# Patient Record
Sex: FEMALE | Race: WHITE | NOT HISPANIC OR LATINO | Employment: OTHER | URBAN - METROPOLITAN AREA
[De-identification: names, ages, dates, MRNs, and addresses within clinical notes are randomized per-mention and may not be internally consistent; named-entity substitution may affect disease eponyms.]

---

## 2024-10-15 ENCOUNTER — HOSPITAL ENCOUNTER (OUTPATIENT)
Dept: RADIOLOGY | Facility: HOSPITAL | Age: 49
Discharge: HOME | End: 2024-10-15
Payer: COMMERCIAL

## 2024-10-15 VITALS — HEIGHT: 64 IN | WEIGHT: 285 LBS | BODY MASS INDEX: 48.65 KG/M2

## 2024-10-15 DIAGNOSIS — Z12.31 ENCOUNTER FOR SCREENING MAMMOGRAM FOR MALIGNANT NEOPLASM OF BREAST: ICD-10-CM

## 2024-10-15 PROCEDURE — 77067 SCR MAMMO BI INCL CAD: CPT

## 2024-10-15 PROCEDURE — 77067 SCR MAMMO BI INCL CAD: CPT | Performed by: RADIOLOGY

## 2024-10-15 PROCEDURE — 77063 BREAST TOMOSYNTHESIS BI: CPT | Performed by: RADIOLOGY

## 2024-11-01 ENCOUNTER — TELEPHONE (OUTPATIENT)
Dept: PRIMARY CARE | Facility: CLINIC | Age: 49
End: 2024-11-01
Payer: COMMERCIAL

## 2024-11-01 DIAGNOSIS — R92.8 ABNORMAL MAMMOGRAM OF BOTH BREASTS: Primary | ICD-10-CM

## 2024-11-01 NOTE — TELEPHONE ENCOUNTER
PT had her mammogram done 10/10/2024 and pt states she needs an order for a diagnostic mammogram. Pt would like to have this test done before her appt. With Kasey on 11/29/2024. Please advise pt [phone]

## 2024-11-06 PROBLEM — R92.8 ABNORMAL MAMMOGRAM OF BOTH BREASTS: Status: ACTIVE | Noted: 2024-11-06

## 2024-11-20 PROBLEM — U07.1 VIREMIA DUE TO SEVERE ACUTE RESPIRATORY SYNDROME CORONAVIRUS 2 (SARS-COV-2): Status: RESOLVED | Noted: 2024-11-20 | Resolved: 2024-11-20

## 2024-11-20 PROBLEM — J31.0 CHRONIC RHINITIS: Status: ACTIVE | Noted: 2024-11-20

## 2024-11-20 PROBLEM — R09.81 NASAL CONGESTION: Status: RESOLVED | Noted: 2019-02-06 | Resolved: 2024-11-20

## 2024-11-20 PROBLEM — R05.9 COUGH: Status: RESOLVED | Noted: 2019-02-06 | Resolved: 2024-11-20

## 2024-11-21 ENCOUNTER — APPOINTMENT (OUTPATIENT)
Dept: PRIMARY CARE | Facility: CLINIC | Age: 49
End: 2024-11-21
Payer: COMMERCIAL

## 2024-11-21 ENCOUNTER — TELEPHONE (OUTPATIENT)
Dept: PRIMARY CARE | Facility: CLINIC | Age: 49
End: 2024-11-21

## 2024-11-21 VITALS
OXYGEN SATURATION: 97 % | BODY MASS INDEX: 47.73 KG/M2 | HEIGHT: 62 IN | SYSTOLIC BLOOD PRESSURE: 98 MMHG | DIASTOLIC BLOOD PRESSURE: 64 MMHG | WEIGHT: 259.38 LBS | HEART RATE: 101 BPM

## 2024-11-21 DIAGNOSIS — Z00.00 HEALTHCARE MAINTENANCE: ICD-10-CM

## 2024-11-21 DIAGNOSIS — Z12.11 SCREENING FOR COLON CANCER: ICD-10-CM

## 2024-11-21 DIAGNOSIS — B37.2 CANDIDIASIS, INTERTRIGINOUS: Primary | ICD-10-CM

## 2024-11-21 PROCEDURE — 1036F TOBACCO NON-USER: CPT | Performed by: FAMILY MEDICINE

## 2024-11-21 PROCEDURE — 3008F BODY MASS INDEX DOCD: CPT | Performed by: FAMILY MEDICINE

## 2024-11-21 PROCEDURE — 99214 OFFICE O/P EST MOD 30 MIN: CPT | Performed by: FAMILY MEDICINE

## 2024-11-21 RX ORDER — NYSTATIN 100000 [USP'U]/G
1 POWDER TOPICAL 3 TIMES DAILY PRN
Qty: 60 G | Refills: 1 | Status: SHIPPED | OUTPATIENT
Start: 2024-11-21

## 2024-11-21 ASSESSMENT — PATIENT HEALTH QUESTIONNAIRE - PHQ9
1. LITTLE INTEREST OR PLEASURE IN DOING THINGS: NOT AT ALL
2. FEELING DOWN, DEPRESSED OR HOPELESS: NOT AT ALL
SUM OF ALL RESPONSES TO PHQ9 QUESTIONS 1 AND 2: 0

## 2024-11-21 ASSESSMENT — ENCOUNTER SYMPTOMS
LOSS OF SENSATION IN FEET: 0
DEPRESSION: 0
OCCASIONAL FEELINGS OF UNSTEADINESS: 0

## 2024-11-21 NOTE — PROGRESS NOTES
"Subjective   Patient ID: Helga Sampson is a 49 y.o. female who presents for Establish Care (Pt presents as a new pt to establish care, c/o chaffing and itching in groin area with little bumps that come and go, no refills.).  HPI Historian(s): Self    Not spreading. Tried [employer] menthol powder with only symptomatic relief. Started about 2.5w ago.  Denies fever, chills, sweats. Otherwise feeling well.    Review of Systems   Skin:  Positive for rash.   All other systems reviewed and are negative.    Patient's last menstrual period was 10/28/2024 (approximate).    Patient Care Team:  Roderick Norman DO as PCP - General (Family Medicine)    Current Outpatient Medications   Medication Instructions    nystatin (Mycostatin) 100,000 unit/gram powder 1 Application, Topical, 3 times daily PRN       Objective   BP 98/64   Pulse 101   Ht 1.575 m (5' 2\")   Wt 118 kg (259 lb 6 oz)   LMP 10/28/2024 (Approximate)   SpO2 97%   BMI 47.44 kg/m²           Physical Exam  Vitals and nursing note reviewed. Exam conducted with a chaperone present (Elsy).   Constitutional:       General: She is not in acute distress.     Appearance: Normal appearance.      Comments: No assistive device presently being used.   HENT:      Head: Normocephalic and atraumatic.   Eyes:      General: No scleral icterus.     Extraocular Movements: Extraocular movements intact.      Conjunctiva/sclera: Conjunctivae normal.   Pulmonary:      Effort: Pulmonary effort is normal. No respiratory distress.   Skin:     General: Skin is warm and dry.      Coloration: Skin is not jaundiced.      Findings: Rash (beefy red in bilateral inguinal folds with satellite lesions) present.   Neurological:      Mental Status: She is alert and oriented to person, place, and time. Mental status is at baseline.   Psychiatric:         Behavior: Behavior normal.         Assessment & Plan  Candidiasis, intertriginous  Nystatin. No apparent secondary infection, " presently.  Orders:    nystatin (Mycostatin) 100,000 unit/gram powder; Apply 1 Application topically 3 times a day as needed for other (skin yeast infection).    Healthcare maintenance    Orders:    CBC; Future    Comprehensive Metabolic Panel; Future    Lipid Panel; Future    Hemoglobin A1C; Future    Screening for colon cancer    Orders:    Colonoscopy Screening; Average Risk Patient; Future

## 2024-11-21 NOTE — TELEPHONE ENCOUNTER
Patient called asking if Dr. Norman could recommend a good soap to help her with whatever was discussed at her visit today

## 2024-11-21 NOTE — PATIENT INSTRUCTIONS
Obstetrics and/or Gynecology   [facility], Dr. Frances Alberto, Dr. Bella Mcclain, Dr. Coco Kruger, Dr. Tra Mohan [phone]  Dr. Richard Burnette [phone]  Dr. Anshul Sanchez [phone]  [facility] [place] [phone]  [place]  Dr. Tamika Mcmanus, Dr. Corinne Bazella, Maxine Bridges CNP, et al. [phone]    Please return for your next Wellness visit within the next 1-2 months, or 12 months after your last Wellness visit. Please schedule additional problem-focused appointment(s) to address additional problem(s).    Recommended vaccines:  Influenza, annual  Shingrix (shingles) vaccine series  TDaP (tetanus-diphtheria-pertussis) vaccine  Avoid taking Biotin (a vitamin, shows up particularly in hair/nail supplements) for a week prior to any blood tests, as it can interfere with certain results. Fasting for labs means 12 hours, nothing to eat or drink, except water and medications, unless directed otherwise.    For assistance with scheduling referrals or consultations, please call [phone]. For laboratory, radiology, and other tests, please call [phone] ([phone] for pediatrics). Please review prescription inserts and published information for possible adverse effects of all medications. Return after testing or consultation to review results and recommendations, if symptoms persist, change, worsen, or return, or if you have any question or concern. If you do not get results within 7-10 days, or you have any question or concern, please send a message, call the office ([phone]), or return to the office for a follow-up appointment. For non-emergencies, you may call the office. For emergencies, call 9-1-1 or go to the nearest Emergency Department. Please schedule additional appointment(s) to address concern(s) not addressed today. An annual Wellness visit is strongly recommended. A Wellness visit should be dedicated to addressing routine health maintenance matters  (e.g., cancer screenings, cardiovascular screening, etc.). Problem-focused visits, typically prompted by symptoms or specific concerns, are usually conducted separately, particularly if multiple or complex problems need to be addressed.    In general, results are not released or discussed over the telephone, but at an appointment or via  [facility]. Results of tests done through [facility] are released via  [facility] (see below).  [email or web address]   [facility] support line: [phone]

## 2024-11-22 ENCOUNTER — HOSPITAL ENCOUNTER (OUTPATIENT)
Dept: RADIOLOGY | Facility: HOSPITAL | Age: 49
Discharge: HOME | End: 2024-11-22
Payer: COMMERCIAL

## 2024-11-22 DIAGNOSIS — R92.8 ABNORMAL MAMMOGRAM OF BOTH BREASTS: ICD-10-CM

## 2024-11-22 PROCEDURE — 77062 BREAST TOMOSYNTHESIS BI: CPT

## 2024-11-22 PROCEDURE — 76642 ULTRASOUND BREAST LIMITED: CPT | Mod: 50

## 2024-11-22 PROCEDURE — 76983 USE EA ADDL TARGET LESION: CPT | Mod: 50

## 2024-11-25 ENCOUNTER — PHARMACY VISIT (OUTPATIENT)
Dept: PHARMACY | Facility: CLINIC | Age: 49
End: 2024-11-25
Payer: COMMERCIAL

## 2024-11-25 ENCOUNTER — TELEPHONE (OUTPATIENT)
Dept: PRIMARY CARE | Facility: CLINIC | Age: 49
End: 2024-11-25
Payer: COMMERCIAL

## 2024-11-25 DIAGNOSIS — B37.2 CANDIDIASIS, INTERTRIGINOUS: Primary | ICD-10-CM

## 2024-11-25 DIAGNOSIS — R92.8 ABNORMAL MAMMOGRAM OF BOTH BREASTS: Primary | ICD-10-CM

## 2024-11-25 PROCEDURE — RXMED WILLOW AMBULATORY MEDICATION CHARGE

## 2024-11-25 RX ORDER — DOXYCYCLINE 100 MG/1
100 CAPSULE ORAL 2 TIMES DAILY
Qty: 20 CAPSULE | Refills: 0 | Status: SHIPPED | OUTPATIENT
Start: 2024-11-25 | End: 2024-12-05

## 2024-11-25 NOTE — TELEPHONE ENCOUNTER
Result Communication    Resulted Orders   BI mammo bilateral diagnostic tomosynthesis    Narrative    Interpreted By:  Jacquelyn Swan,   STUDY:  BI MAMMO BILATERAL DIAGNOSTIC TOMOSYNTHESIS; BI US BREAST LIMITED  BILATERAL;  11/22/2024 1:48 pm; 11/22/2024 3:32 pm      ACCESSION NUMBER(S):  [id number]; [id number]      ORDERING CLINICIAN:  YING HOUSTON      INDICATION:  The patient was recalled from recent screening mammogram dated  10/15/2024 for bilateral breast asymmetries and bilateral breast  masses. Family history of breast cancer in paternal grandmother.      ,R92.8 Other abnormal and inconclusive findings on diagnostic imaging  of breast      COMPARISON:  Screening mammogram dated 10/15/2024.      FINDINGS:  MAMMOGRAPHY: 2D and tomosynthesis images were reviewed at 1 mm slice  thickness.      Density:  The breasts are extremely dense, which lowers the  sensitivity of mammography.      Additional spot compression tomosynthesis views demonstrates  resolution of asymmetries into normal fibroglandular and fatty breast  tissue with faint persistence. However bilateral breast masses are  again noted on spot compression tomosynthesis views and are further  evaluated with bilateral breast ultrasound.      ULTRASOUND: Targeted ultrasound was performed of the bilateral  breasts by a registered sonographer with elastography. Right breast:  In the right breast at 9 o'clock position, 10 cm from the nipple  there is an oval partially circumscribed and partially obscured  parallel hypoechoic mass measuring up to 1.1 x 0.8 x 0.8 cm.  Evaluation of this is slightly limited due to significant shadowing  from dense fibroglandular breast tissue overlying this region. Within  this limitation, there is no evidence of internal vascularity. This  is predominantly soft on elastography and is likely favored to  represent a probably benign probable complicated cyst/fibroadenoma.      In the right breast at 10 o'clock position,  10 cm from the nipple  there is an oval circumscribed parallel hypoechoic mass with  lobulated margins measuring up to 0.4 x 0.3 x 0.4 cm. No sonographic  evidence of suspicious features like internal vascularity or  posterior acoustic shadowing. This is soft on elastography. This is  likely favored to represent a probably benign probable in the right  breast at 10 o'clock position, 9 cm from the nipple there is another  oval circumscribed parallel hypoechoic mass with lobulated margins  measuring up to 2 x 1.2 x 1.5 cm. This is just adjacent to the  aforementioned mass at 10 o'clock position, 10 cm from the nipple.  This is predominantly soft on elastography. No evidence of suspicious  features like internal vascularity or posterior acoustic shadowing.  This likely corresponds to the larger mass in the right breast and is  likely favored to represent a probably benign probable fibroadenoma.  No additional suspicious masses are noted in the region of targeted  ultrasound.      Left breast: In the left breast at 2 o'clock position, 10 cm from the  nipple there is a partially circumscribed and partially obscured  hypoechoic mass measuring up to 0.5 x 0.5 x 0.6 cm. This is present  within a dense band of heterogenous tissue. This measures up to 0.5 x  0.5 x 0.6 cm. No sonographic evidence of suspicious features like  internal vascularity or posterior acoustic shadowing. This is soft on  elastography. This is likely favored to represent a probably benign  probable fibroadenoma.      In the left breast at 1 o'clock position, 10 cm from the nipple there  is an oval circumscribed parallel hypoechoic mass with microlobulated  margins measuring up to 1.8 x 1.2 x 1.5 cm. No sonographic evidence  of internal vascularity. However there is suggestion of mild  posterior acoustic shadowing. This is predominantly soft on  elastography. This corresponds to the largest mass in the left breast  and is likely favored to represent a  probably benign probable  fibroadenoma.      Extensive scanning of the left axilla demonstrates a morphologically  normal lymph node. No evidence of suspicious lymphadenopathy in the  left axilla.        Impression    Probably benign probable masses in bilateral breasts (3 masses in the  right breast and 2 masses in the left breast) as described above.  These are likely favored to represent probably benign fibroadenomas,  especially given presents in bilateral breasts. These masses most  likely correspond to the mammographic abnormality, however there is  also dense fibroglandular tissue in bilateral breasts with  questionable persistence of architectural distortion. Recommend short  interval follow-up bilateral mammogram and ultrasound in six-months  to document stability.      BI-RADS CATEGORY:  BI-RADS Category:  3 Probably Benign.  Recommendation:  Short-term Interval Follow-up Imaging.  Recommended Date:  6 Months.  Laterality:  Bilateral.      For any future breast imaging appointments, please call [phone]).      Patient letter sent SSHORT      MACRO:  None      Signed by: Jacquelyn Swan 11/22/2024 3:47 PM  Dictation workstation:   [id number]       5:45 PM      Results were successfully communicated with the patient and they acknowledged their understanding.

## 2024-11-25 NOTE — TELEPHONE ENCOUNTER
Patient is asking if its ok if she still uses the nystatin powder while taking doxycycline. Please advise and give her a call.

## 2024-11-25 NOTE — TELEPHONE ENCOUNTER
"----- Message from Roderick Maraserenity sent at 11/25/2024  9:01 AM EST -----  Please let patient know that her    Mammogram is reported as BI-RADS category 3, \"probably benign,\" but requires additional follow-up. Recommend follow-up imaging in 6 months, as ordered, with a follow-up appointment a couple days after, to review results. Return earlier, if any question or concern.  "

## 2024-11-25 NOTE — TELEPHONE ENCOUNTER
Result Communication    Resulted Orders   BI mammo bilateral diagnostic tomosynthesis    Narrative    Interpreted By:  Jacquelyn Swan,   STUDY:  BI MAMMO BILATERAL DIAGNOSTIC TOMOSYNTHESIS; BI US BREAST LIMITED  BILATERAL;  11/22/2024 1:48 pm; 11/22/2024 3:32 pm      ACCESSION NUMBER(S):  [id number]; [id number]      ORDERING CLINICIAN:  YING HOUSTON      INDICATION:  The patient was recalled from recent screening mammogram dated  10/15/2024 for bilateral breast asymmetries and bilateral breast  masses. Family history of breast cancer in paternal grandmother.      ,R92.8 Other abnormal and inconclusive findings on diagnostic imaging  of breast      COMPARISON:  Screening mammogram dated 10/15/2024.      FINDINGS:  MAMMOGRAPHY: 2D and tomosynthesis images were reviewed at 1 mm slice  thickness.      Density:  The breasts are extremely dense, which lowers the  sensitivity of mammography.      Additional spot compression tomosynthesis views demonstrates  resolution of asymmetries into normal fibroglandular and fatty breast  tissue with faint persistence. However bilateral breast masses are  again noted on spot compression tomosynthesis views and are further  evaluated with bilateral breast ultrasound.      ULTRASOUND: Targeted ultrasound was performed of the bilateral  breasts by a registered sonographer with elastography. Right breast:  In the right breast at 9 o'clock position, 10 cm from the nipple  there is an oval partially circumscribed and partially obscured  parallel hypoechoic mass measuring up to 1.1 x 0.8 x 0.8 cm.  Evaluation of this is slightly limited due to significant shadowing  from dense fibroglandular breast tissue overlying this region. Within  this limitation, there is no evidence of internal vascularity. This  is predominantly soft on elastography and is likely favored to  represent a probably benign probable complicated cyst/fibroadenoma.      In the right breast at 10 o'clock position,  10 cm from the nipple  there is an oval circumscribed parallel hypoechoic mass with  lobulated margins measuring up to 0.4 x 0.3 x 0.4 cm. No sonographic  evidence of suspicious features like internal vascularity or  posterior acoustic shadowing. This is soft on elastography. This is  likely favored to represent a probably benign probable in the right  breast at 10 o'clock position, 9 cm from the nipple there is another  oval circumscribed parallel hypoechoic mass with lobulated margins  measuring up to 2 x 1.2 x 1.5 cm. This is just adjacent to the  aforementioned mass at 10 o'clock position, 10 cm from the nipple.  This is predominantly soft on elastography. No evidence of suspicious  features like internal vascularity or posterior acoustic shadowing.  This likely corresponds to the larger mass in the right breast and is  likely favored to represent a probably benign probable fibroadenoma.  No additional suspicious masses are noted in the region of targeted  ultrasound.      Left breast: In the left breast at 2 o'clock position, 10 cm from the  nipple there is a partially circumscribed and partially obscured  hypoechoic mass measuring up to 0.5 x 0.5 x 0.6 cm. This is present  within a dense band of heterogenous tissue. This measures up to 0.5 x  0.5 x 0.6 cm. No sonographic evidence of suspicious features like  internal vascularity or posterior acoustic shadowing. This is soft on  elastography. This is likely favored to represent a probably benign  probable fibroadenoma.      In the left breast at 1 o'clock position, 10 cm from the nipple there  is an oval circumscribed parallel hypoechoic mass with microlobulated  margins measuring up to 1.8 x 1.2 x 1.5 cm. No sonographic evidence  of internal vascularity. However there is suggestion of mild  posterior acoustic shadowing. This is predominantly soft on  elastography. This corresponds to the largest mass in the left breast  and is likely favored to represent a  probably benign probable  fibroadenoma.      Extensive scanning of the left axilla demonstrates a morphologically  normal lymph node. No evidence of suspicious lymphadenopathy in the  left axilla.        Impression    Probably benign probable masses in bilateral breasts (3 masses in the  right breast and 2 masses in the left breast) as described above.  These are likely favored to represent probably benign fibroadenomas,  especially given presents in bilateral breasts. These masses most  likely correspond to the mammographic abnormality, however there is  also dense fibroglandular tissue in bilateral breasts with  questionable persistence of architectural distortion. Recommend short  interval follow-up bilateral mammogram and ultrasound in six-months  to document stability.      BI-RADS CATEGORY:  BI-RADS Category:  3 Probably Benign.  Recommendation:  Short-term Interval Follow-up Imaging.  Recommended Date:  6 Months.  Laterality:  Bilateral.      For any future breast imaging appointments, please call [phone]).      Patient letter sent SSHORT      MACRO:  None      Signed by: Jacquelyn Swan 11/22/2024 3:47 PM  Dictation workstation:   [id number]       5:08 PM      Results were successfully communicated with the patient and they acknowledged their understanding.

## 2024-11-25 NOTE — TELEPHONE ENCOUNTER
Pt states the cream is not working and if Kasey can send antibiotics to [facility]. Please advise pt [phone]     Pt is also asking for the ultrasound (bilateral breast) order to be added to her chart to make the appt. In 6 mths.

## 2024-11-29 ENCOUNTER — APPOINTMENT (OUTPATIENT)
Dept: PRIMARY CARE | Facility: CLINIC | Age: 49
End: 2024-11-29
Payer: COMMERCIAL

## 2024-12-04 ENCOUNTER — APPOINTMENT (OUTPATIENT)
Dept: PRIMARY CARE | Facility: CLINIC | Age: 49
End: 2024-12-04
Payer: COMMERCIAL

## 2024-12-06 ENCOUNTER — OFFICE VISIT (OUTPATIENT)
Dept: PRIMARY CARE | Facility: CLINIC | Age: 49
End: 2024-12-06
Payer: COMMERCIAL

## 2024-12-06 VITALS
BODY MASS INDEX: 47.46 KG/M2 | OXYGEN SATURATION: 97 % | DIASTOLIC BLOOD PRESSURE: 88 MMHG | WEIGHT: 259.5 LBS | HEART RATE: 98 BPM | SYSTOLIC BLOOD PRESSURE: 125 MMHG

## 2024-12-06 DIAGNOSIS — B37.2 CANDIDIASIS, INTERTRIGINOUS: Primary | ICD-10-CM

## 2024-12-06 DIAGNOSIS — L08.89 SECONDARY INFECTION OF SKIN: ICD-10-CM

## 2024-12-06 PROCEDURE — 99214 OFFICE O/P EST MOD 30 MIN: CPT | Performed by: FAMILY MEDICINE

## 2024-12-06 PROCEDURE — 1036F TOBACCO NON-USER: CPT | Performed by: FAMILY MEDICINE

## 2024-12-06 RX ORDER — DOXYCYCLINE 100 MG/1
100 CAPSULE ORAL 2 TIMES DAILY
Qty: 20 CAPSULE | Refills: 0 | Status: SHIPPED | OUTPATIENT
Start: 2024-12-06 | End: 2024-12-16

## 2024-12-06 RX ORDER — NYSTATIN 100000 [USP'U]/G
1 POWDER TOPICAL 3 TIMES DAILY PRN
Qty: 60 G | Refills: 2 | Status: SHIPPED | OUTPATIENT
Start: 2024-12-06

## 2024-12-06 RX ORDER — FLUCONAZOLE 150 MG/1
150 TABLET ORAL
Qty: 5 TABLET | Refills: 0 | Status: SHIPPED | OUTPATIENT
Start: 2024-12-08 | End: 2025-01-06

## 2024-12-06 ASSESSMENT — ENCOUNTER SYMPTOMS
DIAPHORESIS: 0
CHILLS: 0
FEVER: 0

## 2024-12-06 NOTE — PROGRESS NOTES
Subjective   Patient ID: Helga Sampson is a 49 y.o. female who presents for cysts (Pt presents c/o ongoing cysts that she is getting in groin region, states it gets better then comes back. Refill needed.).  HPI Historian(s): Self    Rash in inguinal creases has resolved. Rash in genital/mons pubis area almost disappears (but not quite), then flares up and comes back. Both the Nystatin and Doxycycline appeared to help.    Review of Systems   Constitutional:  Negative for chills, diaphoresis and fever.   Skin:  Positive for rash.   All other systems reviewed and are negative.    Patient's last menstrual period was 10/28/2024 (approximate).    Patient Care Team:  Roderick Norman DO as PCP - General (Family Medicine)    Current Outpatient Medications   Medication Instructions    doxycycline (VIBRAMYCIN) 100 mg, oral, 2 times daily, Take with at least 8 ounces (large glass) of water, do not lie down for 30 minutes after    [START ON 12/8/2024] fluconazole (DIFLUCAN) 150 mg, oral, Once Weekly    nystatin (Mycostatin) 100,000 unit/gram powder 1 Application, Topical, 3 times daily PRN       Objective   /88   Pulse 98   Wt 118 kg (259 lb 8 oz)   LMP 10/28/2024 (Approximate)   SpO2 97%   BMI 47.46 kg/m²           Physical Exam  Vitals and nursing note reviewed. Exam conducted with a chaperone present (Nikky).   Constitutional:       General: She is not in acute distress.     Appearance: Normal appearance.      Comments: No assistive device presently being used.   HENT:      Head: Normocephalic and atraumatic.   Eyes:      General: No scleral icterus.     Extraocular Movements: Extraocular movements intact.      Conjunctiva/sclera: Conjunctivae normal.   Pulmonary:      Effort: Pulmonary effort is normal. No respiratory distress.   Skin:     General: Skin is warm and dry.      Coloration: Skin is not jaundiced.      Findings: Rash (intertrigo inguinal folds and mons pubis, labia maiora) present.   Neurological:       Mental Status: She is alert and oriented to person, place, and time. Mental status is at baseline.   Psychiatric:         Behavior: Behavior normal.         Assessment & Plan  Candidiasis, intertriginous  Nystatin plus Fluconazole.  Orders:    nystatin (Mycostatin) 100,000 unit/gram powder; Apply 1 Application topically 3 times a day as needed for other (skin yeast infection).    fluconazole (Diflucan) 150 mg tablet; Take 1 tablet (150 mg) by mouth 1 (one) time per week for 5 doses.    Secondary infection of skin  Repeat doxycycline if does not clear up with Nystatin plus Fluconazole, or any worsening. Dermatology if still persists.  Orders:    doxycycline (Vibramycin) 100 mg capsule; Take 1 capsule (100 mg) by mouth 2 times a day for 10 days. Take with at least 8 ounces (large glass) of water, do not lie down for 30 minutes after

## 2024-12-06 NOTE — ASSESSMENT & PLAN NOTE
Nystatin plus Fluconazole.  Orders:    nystatin (Mycostatin) 100,000 unit/gram powder; Apply 1 Application topically 3 times a day as needed for other (skin yeast infection).    fluconazole (Diflucan) 150 mg tablet; Take 1 tablet (150 mg) by mouth 1 (one) time per week for 5 doses.

## 2024-12-06 NOTE — PATIENT INSTRUCTIONS
Please return or seek medical attention if symptoms persist, change, worsen, or return. For emergencies, call 9-1-1 or go to the nearest Emergency Room. Please schedule additional problem-focused appointment(s) to address additional problem(s).    Avoid taking Biotin (a vitamin, shows up particularly in hair/nail supplements) for a week prior to any blood tests, as it can interfere with certain results. Fasting for labs means 12 hours, nothing to eat or drink, except water and medications, unless directed otherwise.    For assistance with scheduling referrals or consultations, please call [phone]. For laboratory, radiology, and other tests, please call [phone] ([phone] for pediatrics). Please review prescription inserts and published information for possible adverse effects of all medications. Return after testing or consultation to review results and recommendations, if symptoms persist, change, worsen, or return, or if you have any question or concern. If you do not get results within 7-10 days, or you have any question or concern, please send a message, call the office ([phone]), or return to the office for a follow-up appointment. For non-emergencies, you may call the office. For emergencies, call 9-1-1 or go to the nearest Emergency Department. Please schedule additional appointment(s) to address concern(s) not addressed today. An annual Wellness visit is strongly recommended. A Wellness visit should be dedicated to addressing routine health maintenance matters (e.g., cancer screenings, cardiovascular screening, etc.). Problem-focused visits, typically prompted by symptoms or specific concerns, are usually conducted separately, particularly if multiple or complex problems need to be addressed.    In general, results are not released or discussed over the telephone, but at an appointment or via  [facility]. Results of tests done through [facility] are released via  [facility] (see  below).  [email or web address]  [facility] MyChart support line: [phone]

## 2024-12-09 ENCOUNTER — LAB (OUTPATIENT)
Dept: LAB | Facility: LAB | Age: 49
End: 2024-12-09
Payer: COMMERCIAL

## 2024-12-09 DIAGNOSIS — Z00.00 HEALTHCARE MAINTENANCE: ICD-10-CM

## 2024-12-09 LAB
ALBUMIN SERPL BCP-MCNC: 4.1 G/DL (ref 3.4–5)
ALP SERPL-CCNC: 90 U/L (ref 33–110)
ALT SERPL W P-5'-P-CCNC: 19 U/L (ref 7–45)
ANION GAP SERPL CALC-SCNC: 17 MMOL/L (ref 10–20)
AST SERPL W P-5'-P-CCNC: 13 U/L (ref 9–39)
BILIRUB SERPL-MCNC: 0.6 MG/DL (ref 0–1.2)
BUN SERPL-MCNC: 10 MG/DL (ref 6–23)
CALCIUM SERPL-MCNC: 9 MG/DL (ref 8.6–10.3)
CHLORIDE SERPL-SCNC: 97 MMOL/L (ref 98–107)
CHOLEST SERPL-MCNC: 200 MG/DL (ref 0–199)
CHOLESTEROL/HDL RATIO: 3.9
CO2 SERPL-SCNC: 27 MMOL/L (ref 21–32)
CREAT SERPL-MCNC: 0.59 MG/DL (ref 0.5–1.05)
EGFRCR SERPLBLD CKD-EPI 2021: >90 ML/MIN/1.73M*2
ERYTHROCYTE [DISTWIDTH] IN BLOOD BY AUTOMATED COUNT: 12.2 % (ref 11.5–14.5)
EST. AVERAGE GLUCOSE BLD GHB EST-MCNC: 289 MG/DL
GLUCOSE SERPL-MCNC: 300 MG/DL (ref 74–99)
HBA1C MFR BLD: 11.7 %
HCT VFR BLD AUTO: 50.2 % (ref 36–46)
HDLC SERPL-MCNC: 51.3 MG/DL
HGB BLD-MCNC: 15.9 G/DL (ref 12–16)
LDLC SERPL CALC-MCNC: 109 MG/DL
MCH RBC QN AUTO: 29.2 PG (ref 26–34)
MCHC RBC AUTO-ENTMCNC: 31.7 G/DL (ref 32–36)
MCV RBC AUTO: 92 FL (ref 80–100)
NON HDL CHOLESTEROL: 149 MG/DL (ref 0–149)
NRBC BLD-RTO: 0 /100 WBCS (ref 0–0)
PLATELET # BLD AUTO: 304 X10*3/UL (ref 150–450)
POTASSIUM SERPL-SCNC: 3.8 MMOL/L (ref 3.5–5.3)
PROT SERPL-MCNC: 6.8 G/DL (ref 6.4–8.2)
RBC # BLD AUTO: 5.45 X10*6/UL (ref 4–5.2)
SODIUM SERPL-SCNC: 137 MMOL/L (ref 136–145)
TRIGL SERPL-MCNC: 200 MG/DL (ref 0–149)
VLDL: 40 MG/DL (ref 0–40)
WBC # BLD AUTO: 7.7 X10*3/UL (ref 4.4–11.3)

## 2024-12-09 PROCEDURE — 80061 LIPID PANEL: CPT

## 2024-12-09 PROCEDURE — 85027 COMPLETE CBC AUTOMATED: CPT

## 2024-12-09 PROCEDURE — 80053 COMPREHEN METABOLIC PANEL: CPT

## 2024-12-09 PROCEDURE — 36415 COLL VENOUS BLD VENIPUNCTURE: CPT

## 2024-12-09 PROCEDURE — 83036 HEMOGLOBIN GLYCOSYLATED A1C: CPT

## 2024-12-11 DIAGNOSIS — E11.65 TYPE 2 DIABETES MELLITUS WITH HYPERGLYCEMIA, WITHOUT LONG-TERM CURRENT USE OF INSULIN: Primary | ICD-10-CM

## 2024-12-11 RX ORDER — INSULIN DEGLUDEC 100 U/ML
10 INJECTION, SOLUTION SUBCUTANEOUS NIGHTLY
Qty: 3 ML | Refills: 3 | Status: SHIPPED | OUTPATIENT
Start: 2024-12-11

## 2024-12-11 RX ORDER — PEN NEEDLE, DIABETIC 30 GX3/16"
1 NEEDLE, DISPOSABLE MISCELLANEOUS NIGHTLY
Qty: 100 EACH | Refills: 3 | Status: SHIPPED
Start: 2024-12-11 | End: 2024-12-11

## 2024-12-11 RX ORDER — PEN NEEDLE, DIABETIC 30 GX3/16"
1 NEEDLE, DISPOSABLE MISCELLANEOUS NIGHTLY
Qty: 100 EACH | Refills: 3 | Status: SHIPPED | OUTPATIENT
Start: 2024-12-11

## 2024-12-11 RX ORDER — INSULIN DEGLUDEC 100 U/ML
10 INJECTION, SOLUTION SUBCUTANEOUS NIGHTLY
Qty: 3 ML | Refills: 3 | Status: SHIPPED
Start: 2024-12-11 | End: 2024-12-11

## 2024-12-12 ENCOUNTER — TELEPHONE (OUTPATIENT)
Dept: PRIMARY CARE | Facility: CLINIC | Age: 49
End: 2024-12-12

## 2024-12-12 ENCOUNTER — OFFICE VISIT (OUTPATIENT)
Dept: PRIMARY CARE | Facility: CLINIC | Age: 49
End: 2024-12-12
Payer: COMMERCIAL

## 2024-12-12 VITALS
DIASTOLIC BLOOD PRESSURE: 84 MMHG | BODY MASS INDEX: 46.53 KG/M2 | WEIGHT: 254.38 LBS | OXYGEN SATURATION: 98 % | HEART RATE: 96 BPM | SYSTOLIC BLOOD PRESSURE: 123 MMHG

## 2024-12-12 DIAGNOSIS — B37.2 CANDIDIASIS, INTERTRIGINOUS: ICD-10-CM

## 2024-12-12 DIAGNOSIS — E11.65 TYPE 2 DIABETES MELLITUS WITH HYPERGLYCEMIA, WITHOUT LONG-TERM CURRENT USE OF INSULIN: Primary | ICD-10-CM

## 2024-12-12 DIAGNOSIS — E78.00 HYPERCHOLESTEROLEMIA: ICD-10-CM

## 2024-12-12 LAB
POC ALBUMIN /CREATININE RATIO MANUALLY ENTERED: ABNORMAL UG/MG CREAT
POC URINE ALBUMIN: 80 MG/L
POC URINE CREATININE: 300 MG/DL

## 2024-12-12 PROCEDURE — 99214 OFFICE O/P EST MOD 30 MIN: CPT | Performed by: FAMILY MEDICINE

## 2024-12-12 PROCEDURE — 3074F SYST BP LT 130 MM HG: CPT | Performed by: FAMILY MEDICINE

## 2024-12-12 PROCEDURE — 3079F DIAST BP 80-89 MM HG: CPT | Performed by: FAMILY MEDICINE

## 2024-12-12 PROCEDURE — 82044 UR ALBUMIN SEMIQUANTITATIVE: CPT | Performed by: FAMILY MEDICINE

## 2024-12-12 RX ORDER — ATORVASTATIN CALCIUM 10 MG/1
10 TABLET, FILM COATED ORAL NIGHTLY
Qty: 100 TABLET | Refills: 0 | Status: SHIPPED | OUTPATIENT
Start: 2024-12-12

## 2024-12-12 RX ORDER — DEXTROSE 4 G
TABLET,CHEWABLE ORAL
Qty: 1 EACH | Refills: 0 | Status: SHIPPED | OUTPATIENT
Start: 2024-12-12

## 2024-12-12 RX ORDER — LANCETS
EACH MISCELLANEOUS
Qty: 100 EACH | Refills: 3 | Status: SHIPPED | OUTPATIENT
Start: 2024-12-12

## 2024-12-12 RX ORDER — ISOPROPYL ALCOHOL 70 ML/100ML
SWAB TOPICAL
Qty: 100 EACH | Refills: 3 | Status: SHIPPED | OUTPATIENT
Start: 2024-12-12

## 2024-12-12 RX ORDER — IBUPROFEN 200 MG
CAPSULE ORAL
Qty: 100 STRIP | Refills: 3 | Status: SHIPPED | OUTPATIENT
Start: 2024-12-12

## 2024-12-12 RX ORDER — NYSTATIN 100000 [USP'U]/G
1 POWDER TOPICAL 3 TIMES DAILY PRN
Qty: 60 G | Refills: 2 | Status: SHIPPED | OUTPATIENT
Start: 2024-12-12

## 2024-12-12 ASSESSMENT — ENCOUNTER SYMPTOMS: BACK PAIN: 1

## 2024-12-12 NOTE — TELEPHONE ENCOUNTER
----- Message from Roderick Norman sent at 12/11/2024  2:33 PM EST -----  Please make sure patient is aware of the comments or MyChart message.    A1c is very high (>= 9%). An A1C above 9% increases the risk of long-term diabetes complications, e.g., blindness, nerve damage, kidney failure. Recommend adhering to medication regimen. Medication adjustments may be needed.  A prescription for insulin has been sent to your pharmacy.  Please return for a follow-up appointment to discuss results and options, follow instructions given, or follow-up with endocrinology.  The hemoglobin A1c is a test that gives an indication of 3-month average glucose level.  Recommend a low-sugar, low-simple-carbohydrate, low-cholesterol, heart-healthy diet and lifestyle, and regular cardio exercise and weight loss as appropriate.

## 2024-12-12 NOTE — ASSESSMENT & PLAN NOTE
Orders:    nystatin (Mycostatin) 100,000 unit/gram powder; Apply 1 Application topically 3 times a day as needed for other (skin yeast infection).

## 2024-12-12 NOTE — ASSESSMENT & PLAN NOTE
Start long-acting insulin. Titrate as necessary. Return 3 months.   Orders:    POCT Albumin random urine manually resulted    Follow Up In Primary Care; Future    Hemoglobin A1C; Future    alcohol swabs pads, medicated; Check once daily, as instructed: fasting glucose at least once a week, and post-prandial 2-4 hours after a meal once daily other days. May dispense formulary equivalent. Dx: DM-2 (E11.9).    blood sugar diagnostic (Blood Glucose Test) strip; Check once daily, as instructed: fasting glucose at least once a week, and post-prandial 2-4 hours after a meal once daily other days. May dispense formulary equivalent. Dx: DM-2 (E11.9).    blood-glucose meter misc; Check once daily, as instructed: fasting glucose at least once a week, and post-prandial 2-4 hours after a meal once daily other days. May dispense formulary equivalent. Dx: DM-2 (E11.9).    lancets (Lancets,Ultra Thin) misc; Check once daily, as instructed: fasting glucose at least once a week, and post-prandial 2-4 hours after a meal once daily other days. May dispense formulary equivalent. Dx: DM-2 (E11.9).    Referral to Diabetes Education; Future

## 2024-12-12 NOTE — PROGRESS NOTES
"Subjective   Patient ID: Helga Sampson is a 49 y.o. female who presents for Follow-up (Pt presents to F/U A1c and discuss options refill needed.).  HPI Historian(s): Self    Generally feeling well.     Review of Systems   Musculoskeletal:  Positive for back pain.   Skin:  Positive for rash.   All other systems reviewed and are negative.    Patient's last menstrual period was 10/28/2024 (approximate).    Patient Care Team:  Roderick Norman DO as PCP - General (Family Medicine)    Current Outpatient Medications   Medication Instructions    alcohol swabs pads, medicated Check once daily, as instructed: fasting glucose at least once a week, and post-prandial 2-4 hours after a meal once daily other days. May dispense formulary equivalent. Dx: DM-2 (E11.9).    atorvastatin (LIPITOR) 10 mg, oral, Nightly    blood sugar diagnostic (Blood Glucose Test) strip Check once daily, as instructed: fasting glucose at least once a week, and post-prandial 2-4 hours after a meal once daily other days. May dispense formulary equivalent. Dx: DM-2 (E11.9).    blood-glucose meter misc Check once daily, as instructed: fasting glucose at least once a week, and post-prandial 2-4 hours after a meal once daily other days. May dispense formulary equivalent. Dx: DM-2 (E11.9).    doxycycline (VIBRAMYCIN) 100 mg, oral, 2 times daily, Take with at least 8 ounces (large glass) of water, do not lie down for 30 minutes after    fluconazole (DIFLUCAN) 150 mg, oral, Once Weekly    insulin degludec (TRESIBA FLEXTOUCH U-100) 10 Units, subcutaneous, Nightly, Take as directed per insulin instructions.    lancets (Lancets,Ultra Thin) misc Check once daily, as instructed: fasting glucose at least once a week, and post-prandial 2-4 hours after a meal once daily other days. May dispense formulary equivalent. Dx: DM-2 (E11.9).    nystatin (Mycostatin) 100,000 unit/gram powder 1 Application, Topical, 3 times daily PRN    pen needle, diabetic 32 gauge x 5/32\" " needle 1 each, miscellaneous, Nightly       Objective   /84   Pulse 96   Wt 115 kg (254 lb 6 oz)   LMP 10/28/2024 (Approximate)   SpO2 98%   BMI 46.53 kg/m²           Physical Exam  Vitals and nursing note reviewed.   Constitutional:       General: She is not in acute distress.     Appearance: Normal appearance.      Comments: No assistive device presently being used.   HENT:      Head: Normocephalic and atraumatic.   Eyes:      General: No scleral icterus.     Extraocular Movements: Extraocular movements intact.      Conjunctiva/sclera: Conjunctivae normal.   Pulmonary:      Effort: Pulmonary effort is normal. No respiratory distress.   Skin:     General: Skin is warm and dry.      Coloration: Skin is not jaundiced.   Neurological:      Mental Status: She is alert and oriented to person, place, and time. Mental status is at baseline.   Psychiatric:         Behavior: Behavior normal.         Assessment & Plan  Type 2 diabetes mellitus with hyperglycemia, without long-term current use of insulin  Start long-acting insulin. Titrate as necessary. Return 3 months.   Orders:    POCT Albumin random urine manually resulted    Follow Up In Primary Care; Future    Hemoglobin A1C; Future    alcohol swabs pads, medicated; Check once daily, as instructed: fasting glucose at least once a week, and post-prandial 2-4 hours after a meal once daily other days. May dispense formulary equivalent. Dx: DM-2 (E11.9).    blood sugar diagnostic (Blood Glucose Test) strip; Check once daily, as instructed: fasting glucose at least once a week, and post-prandial 2-4 hours after a meal once daily other days. May dispense formulary equivalent. Dx: DM-2 (E11.9).    blood-glucose meter misc; Check once daily, as instructed: fasting glucose at least once a week, and post-prandial 2-4 hours after a meal once daily other days. May dispense formulary equivalent. Dx: DM-2 (E11.9).    lancets (Lancets,Ultra Thin) misc; Check once daily, as  instructed: fasting glucose at least once a week, and post-prandial 2-4 hours after a meal once daily other days. May dispense formulary equivalent. Dx: DM-2 (E11.9).    Referral to Diabetes Education; Future    Candidiasis, intertriginous    Orders:    nystatin (Mycostatin) 100,000 unit/gram powder; Apply 1 Application topically 3 times a day as needed for other (skin yeast infection).    Hypercholesterolemia  Target LDL < 70.  Orders:    atorvastatin (Lipitor) 10 mg tablet; Take 1 tablet (10 mg) by mouth once daily at bedtime.    Follow Up In Primary Care; Future    Comprehensive Metabolic Panel; Future    Lipid Panel; Future

## 2024-12-12 NOTE — TELEPHONE ENCOUNTER
Result Communication    Resulted Orders   CBC   Result Value Ref Range    WBC 7.7 4.4 - 11.3 x10*3/uL    nRBC 0.0 0.0 - 0.0 /100 WBCs    RBC 5.45 (H) 4.00 - 5.20 x10*6/uL    Hemoglobin 15.9 12.0 - 16.0 g/dL    Hematocrit 50.2 (H) 36.0 - 46.0 %    MCV 92 80 - 100 fL    MCH 29.2 26.0 - 34.0 pg    MCHC 31.7 (L) 32.0 - 36.0 g/dL    RDW 12.2 11.5 - 14.5 %    Platelets 304 150 - 450 x10*3/uL   Comprehensive Metabolic Panel   Result Value Ref Range    Glucose 300 (H) 74 - 99 mg/dL    Sodium 137 136 - 145 mmol/L    Potassium 3.8 3.5 - 5.3 mmol/L    Chloride 97 (L) 98 - 107 mmol/L    Bicarbonate 27 21 - 32 mmol/L    Anion Gap 17 10 - 20 mmol/L    Urea Nitrogen 10 6 - 23 mg/dL    Creatinine 0.59 0.50 - 1.05 mg/dL    eGFR >90 >60 mL/min/1.73m*2      Comment:      Calculations of estimated GFR are performed using the 2021 CKD-EPI Study Refit equation without the race variable for the IDMS-Traceable creatinine methods.  https://jasn.asnjournals.org/content/early/2021/09/22/ASN.0761019283    Calcium 9.0 8.6 - 10.3 mg/dL    Albumin 4.1 3.4 - 5.0 g/dL    Alkaline Phosphatase 90 33 - 110 U/L    Total Protein 6.8 6.4 - 8.2 g/dL    AST 13 9 - 39 U/L    Bilirubin, Total 0.6 0.0 - 1.2 mg/dL    ALT 19 7 - 45 U/L      Comment:      Patients treated with Sulfasalazine may generate falsely decreased results for ALT.   Lipid Panel   Result Value Ref Range    Cholesterol 200 (H) 0 - 199 mg/dL      Comment:            Age      Desirable   Borderline High   High     0-19 Y     0 - 169       170 - 199     >/= 200    20-24 Y     0 - 189       190 - 224     >/= 225         >24 Y     0 - 199       200 - 239     >/= 240   **All ranges are based on fasting samples. Specific   therapeutic targets will vary based on patient-specific   cardiac risk.    Pediatric guidelines reference:Pediatrics 2011, 128(S5).Adult guidelines reference: NCEP ATPIII Guidelines,PANCHO 2001, 258:2486-97    Venipuncture immediately after or during the administration of  Metamizole may lead to falsely low results. Testing should be performed immediately prior to Metamizole dosing.    HDL-Cholesterol 51.3 mg/dL      Comment:        Age       Very Low   Low     Normal    High    0-19 Y    < 35      < 40     40-45     ----  20-24 Y    ----     < 40      >45      ----        >24 Y      ----     < 40     40-60      >60      Cholesterol/HDL Ratio 3.9       Comment:        Ref Values  Desirable  < 3.4  High Risk  > 5.0    LDL Calculated 109 (H) <=99 mg/dL      Comment:                                  Near   Borderline      AGE      Desirable  Optimal    High     High     Very High     0-19 Y     0 - 109     ---    110-129   >/= 130     ----    20-24 Y     0 - 119     ---    120-159   >/= 160     ----      >24 Y     0 -  99   100-129  130-159   160-189     >/=190      VLDL 40 0 - 40 mg/dL    Triglycerides 200 (H) 0 - 149 mg/dL      Comment:      Age              Desirable        Borderline         High        Very High  SEX:B           mg/dL             mg/dL               mg/dL      mg/dL  <=14D                       ----               ----        ----  15D-365D                    ----               ----        ----  1Y-9Y           0-74               75-99             >=100       ----  10Y-19Y        0-89                            >=130       ----  20Y-24Y        0-114             115-149             >=150      ----  >= 25Y         0-149             150-199             200-499    >=500      Venipuncture immediately after or during the administration of Metamizole may lead to falsely low results. Testing should be performed immediately prior to Metamizole dosing.    Non HDL Cholesterol 149 0 - 149 mg/dL      Comment:            Age       Desirable   Borderline High   High     Very High     0-19 Y     0 - 119       120 - 144     >/= 145    >/= 160    20-24 Y     0 - 149       150 - 189     >/= 190      ----         >24 Y    30 mg/dL above LDL Cholesterol goal      Hemoglobin A1C   Result Value Ref Range    Hemoglobin A1C 11.7 (H) See comment %    Estimated Average Glucose 289 Not Established mg/dL    Narrative    Diagnosis of Diabetes-Adults  Non-Diabetic: < or = 5.6%  Increased risk for developing diabetes: 5.7-6.4%  Diagnostic of diabetes: > or = 6.5%           9:32 AM      Results were not successfully communicated with the patient and they did not acknowledge their understanding. Pt does have a A1c F/U scheduled for today

## 2024-12-12 NOTE — PATIENT INSTRUCTIONS
Start the Tresiba (long-acting insulin) at 10 units every night. Target morning glucose , and never lower than 80 throughout the day. Please increase the Tresiba by 2 units every 3-4 days, to achieve this goal. If your glucose is >250 after 3-4 days, please call for instructions (e.g., a larger increase than 2 units). If you get hypoglycemia, medication might need to be reduced or held.    Some symptoms of hypoglycemia (low glucose) to watch out for: fast heartbeat, shaking, sweating, nervousness or anxiety, irritability or confusion, dizziness, hunger.    Recommend seeing an eye doctor at least annually for a diabetic eye exam. Be sure to visually inspect your feet every day, looking for cuts, scrapes, infections. Propping a mirror against the wall at an angle can be used to help you see the bottoms of your feet, if necessary.  For non-insulin dependents diabetes, check your fasting (12 hours) glucose at least once a week.  Check your glucose 2 to 4 hours after meals, to help understand how different foods affect your glucose. Use this information to help guide food choices, to minimize spikes in glucose. If frequently getting numbers > 200, a medication change or improved medication adherence might be necessary. Seek immediate medical attention (ER, 911) for glucose < 55 or > 400, altered consciousness, seizure, significant dehydration, or other significant concern.  Advise follow-up appointments usually every 3 months.    Ophthalmology  Dr. Rd Ann, et al. [phone]  Dr. Sachi Noriega, et al. [phone]  Dr. Alfa Phan [phone]  Dr. Efrain Chowdary, et al. [phone]  [facility]) [phone]    Highlights of possible Lipitor/atorvastatin side effects:  Diarrhea (7-14%), joint pain (9-12%), muscle pain (3-8%), stuffy nose or sore throat (13%), diabetes (6%), nausea (7%), UTI (7-8%), insomnia (5%), hemorrhagic stroke (2%), increased liver enzymes (2%), muscle injury,  "liver failure, interstitial lung disease, serious allergic reaction.     Please return for a(n) diabetes and medication follow-up appointment in 3 months, after tests to review results and options, earlier if any question or concern. Please schedule additional problem-focused appointment(s) to address additional problem(s).    Recommended vaccines:  Influenza, annual  Prevnar-20 \"pneumonia\" vaccine  Shingrix (shingles) vaccine series  TDaP (tetanus-diphtheria-pertussis) vaccine  Avoid taking Biotin (a vitamin, shows up particularly in hair/nail supplements) for a week prior to any blood tests, as it can interfere with certain results. Fasting for labs means 12 hours, nothing to eat or drink, except water and medications, unless directed otherwise.    For assistance with scheduling referrals or consultations, please call [phone]. For laboratory, radiology, and other tests, please call [phone] ([phone] for pediatrics). Please review prescription inserts and published information for possible adverse effects of all medications. Return after testing or consultation to review results and recommendations, if symptoms persist, change, worsen, or return, or if you have any question or concern. If you do not get results within 7-10 days, or you have any question or concern, please send a message, call the office ([phone]), or return to the office for a follow-up appointment. For non-emergencies, you may call the office. For emergencies, call 9-1-1 or go to the nearest Emergency Department. Please schedule additional appointment(s) to address concern(s) not addressed today. An annual Wellness visit is strongly recommended. A Wellness visit should be dedicated to addressing routine health maintenance matters (e.g., cancer screenings, cardiovascular screening, etc.). Problem-focused visits, typically prompted by symptoms or specific concerns, are usually conducted separately, particularly if multiple or " complex problems need to be addressed.    In general, results are not released or discussed over the telephone, but at an appointment or via  [employer]. Results of tests done through [facility] are released via  [employer] (see below).  [email or web address]   [employer] support line: [phone]

## 2024-12-13 ENCOUNTER — APPOINTMENT (OUTPATIENT)
Dept: PRIMARY CARE | Facility: CLINIC | Age: 49
End: 2024-12-13
Payer: COMMERCIAL

## 2024-12-13 ENCOUNTER — NUTRITION (OUTPATIENT)
Dept: NUTRITION | Facility: HOSPITAL | Age: 49
End: 2024-12-13
Payer: COMMERCIAL

## 2024-12-13 DIAGNOSIS — E11.65 TYPE 2 DIABETES MELLITUS WITH HYPERGLYCEMIA, WITHOUT LONG-TERM CURRENT USE OF INSULIN: ICD-10-CM

## 2024-12-13 PROCEDURE — 99211 OFF/OP EST MAY X REQ PHY/QHP: CPT | Performed by: FAMILY MEDICINE

## 2024-12-16 ENCOUNTER — OFFICE VISIT (OUTPATIENT)
Dept: PRIMARY CARE | Facility: CLINIC | Age: 49
End: 2024-12-16
Payer: COMMERCIAL

## 2024-12-16 VITALS
TEMPERATURE: 97.9 F | BODY MASS INDEX: 46.69 KG/M2 | SYSTOLIC BLOOD PRESSURE: 125 MMHG | OXYGEN SATURATION: 98 % | DIASTOLIC BLOOD PRESSURE: 80 MMHG | WEIGHT: 255.25 LBS | HEART RATE: 100 BPM

## 2024-12-16 DIAGNOSIS — E11.65 TYPE 2 DIABETES MELLITUS WITH HYPERGLYCEMIA, WITHOUT LONG-TERM CURRENT USE OF INSULIN: ICD-10-CM

## 2024-12-16 DIAGNOSIS — F43.29 ADJUSTMENT DISORDER WITH OTHER SYMPTOM: Primary | ICD-10-CM

## 2024-12-16 DIAGNOSIS — F51.04 PSYCHOPHYSIOLOGICAL INSOMNIA: ICD-10-CM

## 2024-12-16 PROBLEM — F43.20 ADJUSTMENT DISORDER: Status: ACTIVE | Noted: 2024-12-16

## 2024-12-16 PROCEDURE — 3049F LDL-C 100-129 MG/DL: CPT | Performed by: FAMILY MEDICINE

## 2024-12-16 PROCEDURE — 3074F SYST BP LT 130 MM HG: CPT | Performed by: FAMILY MEDICINE

## 2024-12-16 PROCEDURE — 99214 OFFICE O/P EST MOD 30 MIN: CPT | Performed by: FAMILY MEDICINE

## 2024-12-16 PROCEDURE — 1036F TOBACCO NON-USER: CPT | Performed by: FAMILY MEDICINE

## 2024-12-16 PROCEDURE — 3046F HEMOGLOBIN A1C LEVEL >9.0%: CPT | Performed by: FAMILY MEDICINE

## 2024-12-16 PROCEDURE — 3079F DIAST BP 80-89 MM HG: CPT | Performed by: FAMILY MEDICINE

## 2024-12-16 RX ORDER — TRAZODONE HYDROCHLORIDE 50 MG/1
25-50 TABLET ORAL NIGHTLY PRN
Qty: 30 TABLET | Refills: 2 | Status: SHIPPED | OUTPATIENT
Start: 2024-12-16

## 2024-12-16 RX ORDER — INSULIN DEGLUDEC 100 U/ML
14 INJECTION, SOLUTION SUBCUTANEOUS NIGHTLY
Qty: 6 ML | Refills: 2
Start: 2024-12-16 | End: 2024-12-20 | Stop reason: SDUPTHER

## 2024-12-16 RX ORDER — BLOOD-GLUCOSE SENSOR
EACH MISCELLANEOUS
Qty: 6 EACH | Refills: 3 | Status: SHIPPED | OUTPATIENT
Start: 2024-12-16 | End: 2024-12-17

## 2024-12-16 ASSESSMENT — ENCOUNTER SYMPTOMS: HEADACHES: 1

## 2024-12-16 NOTE — PATIENT INSTRUCTIONS
Strongly encourage using a Continuous Glucose Monitor, if possible.    Please return for a(n) diabetes, sleep, and medication follow-up appointment in 2-3 months, earlier if any question or concern. Please schedule additional problem-focused appointment(s) to address additional problem(s).    Avoid taking Biotin (a vitamin, shows up particularly in hair/nail supplements) for a week prior to any blood tests, as it can interfere with certain results. Fasting for labs means 12 hours, nothing to eat or drink, except water and medications, unless directed otherwise.    For assistance with scheduling referrals or consultations, please call [phone]. For laboratory, radiology, and other tests, please call [phone] ([phone] for pediatrics). Please review prescription inserts and published information for possible adverse effects of all medications. Return after testing or consultation to review results and recommendations, if symptoms persist, change, worsen, or return, or if you have any question or concern. If you do not get results within 7-10 days, or you have any question or concern, please send a message, call the office ([phone]), or return to the office for a follow-up appointment. For non-emergencies, you may call the office. For emergencies, call 9-1-1 or go to the nearest Emergency Department. Please schedule additional appointment(s) to address concern(s) not addressed today. An annual Wellness visit is strongly recommended. A Wellness visit should be dedicated to addressing routine health maintenance matters (e.g., cancer screenings, cardiovascular screening, etc.). Problem-focused visits, typically prompted by symptoms or specific concerns, are usually conducted separately, particularly if multiple or complex problems need to be addressed.    In general, results are not released or discussed over the telephone, but at an appointment or via  [employer]. Results of tests done through [facility] are released via  [employer] (see below).  [email or web address]   [facility] support line: [phone]

## 2024-12-16 NOTE — PROGRESS NOTES
Subjective   Patient ID: Helga Sampson is a 49 y.o. female who presents for Headache (Pt presents stating she has been having headaches and not sleeping.).  HPI Historian(s): Self    c/o headaches, insomnia, anxiety. Started over the weekend. Felt a lot better after taking a nap yesterday in a recliner.  Snores. Occasional mild morning headaches. Stuffy nose, recently.  Denies PND, daytime somnolence, observed apnea.    Is checking glucose, average low 200s, low of 187.     Review of Systems   Neurological:  Positive for headaches.   All other systems reviewed and are negative.    Patient's last menstrual period was 10/28/2024 (approximate).    Patient Care Team:  Roderick Norman DO as PCP - General (Family Medicine)    Current Outpatient Medications   Medication Instructions    alcohol swabs pads, medicated Check once daily, as instructed: fasting glucose at least once a week, and post-prandial 2-4 hours after a meal once daily other days. May dispense formulary equivalent. Dx: DM-2 (E11.9).    atorvastatin (LIPITOR) 10 mg, oral, Nightly    blood sugar diagnostic (Blood Glucose Test) strip Check once daily, as instructed: fasting glucose at least once a week, and post-prandial 2-4 hours after a meal once daily other days. May dispense formulary equivalent. Dx: DM-2 (E11.9).    blood-glucose meter misc Check once daily, as instructed: fasting glucose at least once a week, and post-prandial 2-4 hours after a meal once daily other days. May dispense formulary equivalent. Dx: DM-2 (E11.9).    blood-glucose sensor (FreeStyle Arely 3 Sensor) device Use as directed    doxycycline (VIBRAMYCIN) 100 mg, oral, 2 times daily, Take with at least 8 ounces (large glass) of water, do not lie down for 30 minutes after    fluconazole (DIFLUCAN) 150 mg, oral, Once Weekly    insulin degludec (TRESIBA FLEXTOUCH U-100) 14 Units, subcutaneous, Nightly, Take as directed per insulin instructions.    lancets (Lancets,Ultra Thin) misc  "Check once daily, as instructed: fasting glucose at least once a week, and post-prandial 2-4 hours after a meal once daily other days. May dispense formulary equivalent. Dx: DM-2 (E11.9).    nystatin (Mycostatin) 100,000 unit/gram powder 1 Application, Topical, 3 times daily PRN    pen needle, diabetic 32 gauge x 5/32\" needle 1 each, miscellaneous, Nightly    traZODone (DESYREL) 25-50 mg, oral, Nightly PRN       Objective   /80   Pulse 100   Temp 36.6 °C (97.9 °F)   Wt 116 kg (255 lb 4 oz)   LMP 10/28/2024 (Approximate)   SpO2 98%   BMI 46.69 kg/m²           Physical Exam  Vitals and nursing note reviewed.   Constitutional:       General: She is not in acute distress.     Appearance: Normal appearance.      Comments: No assistive device presently being used.   HENT:      Head: Normocephalic and atraumatic.   Eyes:      General: No scleral icterus.     Extraocular Movements: Extraocular movements intact.      Conjunctiva/sclera: Conjunctivae normal.   Pulmonary:      Effort: Pulmonary effort is normal. No respiratory distress.   Skin:     General: Skin is warm and dry.      Coloration: Skin is not jaundiced.   Neurological:      Mental Status: She is alert and oriented to person, place, and time. Mental status is at baseline.   Psychiatric:         Mood and Affect: Mood is depressed. Affect is tearful.         Behavior: Behavior normal.         Assessment & Plan  Adjustment disorder with other symptom  Try Trazodone to help with sleep. May consider       Psychophysiological insomnia    Orders:    traZODone (Desyrel) 50 mg tablet; Take 0.5-1 tablets (25-50 mg) by mouth as needed at bedtime for sleep.    Type 2 diabetes mellitus with hyperglycemia, without long-term current use of insulin  Gradually increase Tresiba.  Orders:    blood-glucose sensor (FreeStyle Arely 3 Sensor) device; Use as directed    insulin degludec (Tresiba FlexTouch U-100) 100 unit/mL (3 mL) injection; Inject 14 Units under the skin " once daily at bedtime. Take as directed per insulin instructions.

## 2024-12-16 NOTE — ASSESSMENT & PLAN NOTE
Gradually increase Tresiba.  Orders:    blood-glucose sensor (FreeStyle Arely 3 Sensor) device; Use as directed    insulin degludec (Tresiba FlexTouch U-100) 100 unit/mL (3 mL) injection; Inject 14 Units under the skin once daily at bedtime. Take as directed per insulin instructions.

## 2024-12-16 NOTE — PROGRESS NOTES
Patient visit to clinic for education on Insulin injection technique. Patient recently diagnosed with Type 2 diabetes.  Her recent A1c 12/9/2024 was 11.7%,with a EAG of 289.   PCP has prescribed  Tresiba U-100, 10 units daily,given at night. Patient brought insulin pen to clinic with her. Nurse explained about storage and needle application for daily injections.   Nurse reviewed technique for injecting into subcutaneous fat,rotating injection in abdomen,thighs ,upper arms,or buttocks.Abdomen is preferred for insulin absorption rate.   Rotation of site is important to avoid developing lumps,scar tissue.  Nurse educated on holding pen for 6-10 seconds after injection.   Patient successfully injecting her 10 units for 12/15/2024. using teach back method.Nurse reminded patient that she should start her nightly regime the following day.  Patient will return to clinic for a comprehensive session on diet and lifestyle. Patient and nurse will meet in one weeks time. Patient in agreement . Nurse recommended to phone if she has any problems with administration or questions regarding her new medication.

## 2024-12-17 RX ORDER — FLASH GLUCOSE SENSOR
KIT MISCELLANEOUS
Qty: 6 EACH | Refills: 3 | Status: SHIPPED | OUTPATIENT
Start: 2024-12-17

## 2024-12-20 ENCOUNTER — TELEPHONE (OUTPATIENT)
Dept: PRIMARY CARE | Facility: CLINIC | Age: 49
End: 2024-12-20
Payer: COMMERCIAL

## 2024-12-20 DIAGNOSIS — E11.65 TYPE 2 DIABETES MELLITUS WITH HYPERGLYCEMIA, WITHOUT LONG-TERM CURRENT USE OF INSULIN: ICD-10-CM

## 2024-12-20 RX ORDER — INSULIN DEGLUDEC 100 U/ML
16 INJECTION, SOLUTION SUBCUTANEOUS NIGHTLY
Qty: 9 ML | Refills: 2 | Status: SHIPPED | OUTPATIENT
Start: 2024-12-20

## 2024-12-20 NOTE — TELEPHONE ENCOUNTER
Medication Refill Request:       Medication: insulin degludec (Tresiba FlexTouch U-100) 100 unit/ml injection    Patient is injecting 16 units once daily      LOV: 12/16/2024    NOV: 3/14/2025      Ph[facility]: [facility]

## 2025-01-10 ENCOUNTER — TELEPHONE (OUTPATIENT)
Dept: PRIMARY CARE | Facility: CLINIC | Age: 50
End: 2025-01-10
Payer: COMMERCIAL

## 2025-01-10 DIAGNOSIS — E11.65 TYPE 2 DIABETES MELLITUS WITH HYPERGLYCEMIA, WITHOUT LONG-TERM CURRENT USE OF INSULIN: ICD-10-CM

## 2025-01-10 NOTE — TELEPHONE ENCOUNTER
Pt has questions about how to continue with her insuline - she has 2 pens left but is wondering if she is to be continuing on the 30 units.

## 2025-01-13 RX ORDER — INSULIN DEGLUDEC 100 U/ML
16 INJECTION, SOLUTION SUBCUTANEOUS NIGHTLY
Qty: 9 ML | Refills: 2 | Status: SHIPPED
Start: 2025-01-13 | End: 2025-01-13 | Stop reason: SDUPTHER

## 2025-01-13 RX ORDER — INSULIN DEGLUDEC 100 U/ML
16 INJECTION, SOLUTION SUBCUTANEOUS NIGHTLY
Qty: 9 ML | Refills: 2 | Status: SHIPPED | OUTPATIENT
Start: 2025-01-13

## 2025-01-13 NOTE — TELEPHONE ENCOUNTER
"PT walked into the office asking if she is to continue the 30 units of insulin and if so she needs an updated script sent to [facility] pt states she only has \"one reserve\" left. Pt also needs the Practice ID for the code to put in her [email or web address] hayden so her sugars can be electronically sent to [facility].   "

## 2025-01-16 ENCOUNTER — NUTRITION (OUTPATIENT)
Dept: NUTRITION | Facility: HOSPITAL | Age: 50
End: 2025-01-16
Payer: COMMERCIAL

## 2025-01-16 DIAGNOSIS — E11.9 TYPE 2 DIABETES MELLITUS WITHOUT COMPLICATION, WITHOUT LONG-TERM CURRENT USE OF INSULIN (MULTI): ICD-10-CM

## 2025-01-16 PROCEDURE — 99211 OFF/OP EST MAY X REQ PHY/QHP: CPT | Performed by: FAMILY MEDICINE

## 2025-01-19 NOTE — PROGRESS NOTES
Reason for Visit:  Helga Sampsno is a 49 y.o. female who presents for Follow-up DSME Visit    DSME - Global Assessment  Assessment   Referring Provider: Dr. Roderick Norman  Marital Status: single.  Support Person: Name: Trinity and Relationship to patient: parent.    What do you hope to gain from this diabetes education visit? Patient return to clinic to ensure she is following proper regime for blood sugar stabilization.      Household Composition: living independently, with family    Type of Diabetes: Type 2  What year were you diagnosed with diabetes: December 2024      Comorbidities/Chronic Complications: hyperlipidemia and yeast Infections    Lab Results   Component Value Date    HGBA1C 11.7 (H) 12/09/2024       Demographics:   Difficulties with: None  Race/Ethnic Origin: White/  Occupation:  Independent care provider  Work hours: full time    Health Status:  Smoking/Tobacco Use: No, patient does not smoke or use tobacco.  No    Health Utilization Past 12 Months:   Hospital Admissions: No.  ER Visits: No.  Primary Care Visits: Yes.  Last Eye Exam : Recently seen and  does not need follow up for a year.  Podiatry : NA  Dentist : Needs to schedule. Nurse explained the importance of dental health and diabetes complications.    Diabetes Self-Management Skills and Behaviors:   Do you exercise regularly?: Yes. 3-4 times/week.  Physical Activity : walking  Yes  How do you manage your diabetes when you are sick?: call doctor and drink more fluids    Diabetes Medications: insulin  Current Outpatient Medications   Medication Sig Dispense Refill    alcohol swabs pads, medicated Check once daily, as instructed: fasting glucose at least once a week, and post-prandial 2-4 hours after a meal once daily other days. May dispense formulary equivalent. Dx: DM-2 (E11.9). 100 each 3    atorvastatin (Lipitor) 10 mg tablet Take 1 tablet (10 mg) by mouth once daily at bedtime. 100 tablet 0    blood sugar diagnostic (Blood  "Glucose Test) strip Check once daily, as instructed: fasting glucose at least once a week, and post-prandial 2-4 hours after a meal once daily other days. May dispense formulary equivalent. Dx: DM-2 (E11.9). 100 strip 3    blood-glucose meter misc Check once daily, as instructed: fasting glucose at least once a week, and post-prandial 2-4 hours after a meal once daily other days. May dispense formulary equivalent. Dx: DM-2 (E11.9). 1 each 0    flash glucose sensor kit (FreeStyle Arely 2 Sensor) kit USE AS DIRECTED 6 each 3    insulin degludec (Tresiba FlexTouch U-100) 100 unit/mL (3 mL) injection Inject 16 Units under the skin once daily at bedtime. Or as directed. Max 30 units. 9 mL 2    lancets (Lancets,Ultra Thin) misc Check once daily, as instructed: fasting glucose at least once a week, and post-prandial 2-4 hours after a meal once daily other days. May dispense formulary equivalent. Dx: DM-2 (E11.9). 100 each 3    nystatin (Mycostatin) 100,000 unit/gram powder Apply 1 Application topically 3 times a day as needed for other (skin yeast infection). 60 g 2    pen needle, diabetic 32 gauge x 5/32\" needle 1 each once daily at bedtime. 100 each 3    traZODone (Desyrel) 50 mg tablet Take 0.5-1 tablets (25-50 mg) by mouth as needed at bedtime for sleep. 30 tablet 2     No current facility-administered medications for this visit.     Injection/Infusion Sites: abdominal wall. Appropriate disposal of sharps. Appropriate storage of insulin.    Monitorng: CGM    Acute Complications-Safety: carries glucose and carries identification    Hypoglycemia: shakiness/dizziness  Hypoglycemia Treatment: Educated on S/S of hypoglycemia and 15/15 rule for treatment. In addition consuming protein snack once sugar back in range.     Hyperglycemia: polyuria, polydipsia, and tiredness  Hyperglycemia Treatment: Patient educated on the importance of water and walking if blood sugar high after a meal. Patient currently has excellent  control. " Call PCP if sugar goes above 400-500. In addition if vomiting and high blood sugar seek treatment at ER. Nurse explained that when engaging in exercise or strenuous activity, to consume protein prior to activity and stay well hydrated. It is recommended that blood sugar not be below 120 prior to vigorous exercise.         Diabetes Assessment:   DM Interferes with other aspects of my life: neutral.  My level of stress is high: neutral.  I struggle with making changes in my life: disagree.  Most difficult part of managing DM: Currently is doing well with maintain blood sugar        DSME - Meal Planning and Diet Recall  Nutrition Assessment    Are you currently following any meal plan: Low Carbohydrates and Low Fat.    Does your culture or Yarsanism require any of the following:  N/A  Who does the grocery shopping? patient and mother  Who does the cooking in the home? patient and mother    How many meals do you eat in per day: three.  Which meals do you tend to skip: none  What do you drink with and between meals: water and Protein shakes    Diet Recall:  Patient keeps a journal of each meal eaten on a daily basis. She has cut back on carbohydrates. She attempts to incorporate protein,veg,fruit in each meal.   Attached document is 1 week of diet.     DSME - Goals and Recommendations    [facility] Diabetes Education Program SMART Behavior Goal Setting:        S - Specific: Exactly what do you want to do        M - Measurable: Use a calendar or chart to track progress        A - Attainable: Take small steps to make bigger changes        R - Realistic: Pick something reasonable that you know you can do        T - Time Oriented: Choose a time limit (No longer than 6 months)    Specific Goal:   I will wear or carry a medical alert I.D. at all times.  If on a Diabetes Medication, I will carry a form of sugar or glucose in case of a low blood sugar reaction.  I will eat a heart healthy diet.  I will count  carbohydrates at each meal.   I will take my diabetes medication as directed by my doctor.  I will exercise 3-4 times/week.    Measurable: How will I track my goal:  I will keep track of my progress daily by  journal,CGM .     Time Oriented: two months.     Topics Covered and Impression:   DSME Topics Covered During Visit:   A1c Review  Reducing Your Risk For Other Eduardo Concerns    Reviewed Diabetes Technology: Patient is doing very well keeping blood sugar in range. She is averaging  for fasting Blood sugars.  From January 10-16 th  she was 100% in range , 14 days prior to this 99% in range. Since she has begun wearing her CGM she has been 90% in range. Nurse explained that a goal for individuals with Type 2 diabetes would be 75% in range. She far exceeded this goal.     DSME Topics for Follow-Up:   Taking Medications as Prescribed  Being Physically Active  Review Glycemic Goals (CGM or SMBG)  Reviewed Hypoglycemia Signs/Symptoms/Tx Plan  Reviewed Hyperglycemia Signs/Symptoms/Tx Plan  Glycemic Pattern Management  MyPlate Method    Readiness to Learn: demonstrates willingness to learn and demonstrates ability to learn  Preferred learning method: observing, reading and writing, and doing    DSME - Diabetes Self-Management Support    My Diabetes Self-Management Support Plan (Resources) : Age Well. Be Well Club (for age 55 and over). Call 1-769.411.7840 or [email or web address]. Offer free emailed publication on all [facility] health events and talks with some virtual options available.     Diabetes Support Groups : Attend free lifestyle management & diabetes related talks  [facility], 3rd Tuesday of each month. Speaker led group.2-3 pm.    Magazines and Journals : Diabetes Forecast 1-233.627.1413 www.diabetesforecast.org  Diabetes Self Management 3-481-343-3289 www.diabetesselfmanagement.com    Websites & Large Online Community Forums and Support : www.diabetes.org/ (American Diabetes  "Association) - Call with a question or chat online. Online support community forum for: Type 1 diabetes, Type 2 diabetes and Caregivers.   Enroll in the \"Living with Type 2 diabetes program\" - Select a free e-newsletter or paper mailing option. Receive these and other materials during the year    APPS : Calorie [place] (look up carbohydrate counts in foods and options to track diet, exercise and weight)  Glucose Jg (free, tracks blood glucose, graphs),   My Fitness Pal (free)        Materials provided during today's visit: Patient given following materials at last visit:  Handouts on hypoglycemia and hyperglycemia, diet, carb counting, label reading, and plate method. Food group choices for healthy meal plans. Additional materials on online diabetes educational resources.    Provider Impression: Pt is a very motivated individual who is interested in lifestyle and diet change to control her type 2 diabetes.Since her December visit she has made a complete turn around in diet,and exercise. She Her blood sugar average for last 30 days was 113.   She states she feels great,her headaches have subsided, and her energy has returned. She keeps a food log of daily meals. She watches carb intake and strives for a balanced diet. She verbalized that she has \"learned to read her body.\" This has helped her with self education on diet and lifestyle.    Pt encouraged to continue to choose a variety of foods, to maintain consistent blood glucose levels. Balanced diet to incorporate carb, protein, healthy fats.High fiber foods encouraged to help maintain BS within normal range.  Patient encouraged to continue 7-10,000 steps a day. This may be helpful in reducing his overall blood sugar.   Reviewed variables that effect blood sugar readings and overall A1c outside of food sources.   Pt encouraged to take medication as prescribed.   Nurse recommends return for further education if having difficulty with new lifestyle plan. Patient in " agreement.     Time: I personally spent a total of 90 minutes with the patient providing diabetes self-management education. Visit documentation will be sent electronically to referring provider.

## 2025-01-25 ENCOUNTER — OFFICE VISIT (OUTPATIENT)
Dept: PRIMARY CARE | Facility: CLINIC | Age: 50
End: 2025-01-25
Payer: COMMERCIAL

## 2025-01-25 VITALS
BODY MASS INDEX: 46.93 KG/M2 | OXYGEN SATURATION: 98 % | WEIGHT: 255 LBS | HEIGHT: 62 IN | SYSTOLIC BLOOD PRESSURE: 118 MMHG | HEART RATE: 84 BPM | DIASTOLIC BLOOD PRESSURE: 78 MMHG

## 2025-01-25 DIAGNOSIS — L25.3 CONTACT DERMATITIS DUE TO CHEMICALS: Primary | ICD-10-CM

## 2025-01-25 PROCEDURE — 3008F BODY MASS INDEX DOCD: CPT | Performed by: FAMILY MEDICINE

## 2025-01-25 PROCEDURE — 3078F DIAST BP <80 MM HG: CPT | Performed by: FAMILY MEDICINE

## 2025-01-25 PROCEDURE — 99213 OFFICE O/P EST LOW 20 MIN: CPT | Performed by: FAMILY MEDICINE

## 2025-01-25 PROCEDURE — 3074F SYST BP LT 130 MM HG: CPT | Performed by: FAMILY MEDICINE

## 2025-01-25 PROCEDURE — 1036F TOBACCO NON-USER: CPT | Performed by: FAMILY MEDICINE

## 2025-01-25 RX ORDER — CLOBETASOL PROPIONATE 0.5 MG/G
OINTMENT TOPICAL 2 TIMES DAILY PRN
Qty: 45 G | Refills: 0 | Status: SHIPPED | OUTPATIENT
Start: 2025-01-25

## 2025-01-25 ASSESSMENT — ENCOUNTER SYMPTOMS
NAUSEA: 0
UNEXPECTED WEIGHT CHANGE: 0
APPETITE CHANGE: 0

## 2025-01-25 NOTE — PROGRESS NOTES
"Subjective   Patient ID: Helga Sampson is a 49 y.o. 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 ).    HPI   Freestyle adhesive patches for sanjuana 2 caused terrible rash  Patient is trying other patches and wonders if we have any samples for her to try here    Review of Systems   Constitutional:  Negative for appetite change and unexpected weight change.   Eyes:  Negative for visual disturbance.   Gastrointestinal:  Negative for nausea.       Objective   /78 (BP Location: Left arm, Patient Position: Sitting)   Pulse 84   Ht 1.575 m (5' 2\")   Wt 116 kg (255 lb)   SpO2 98%   BMI 46.64 kg/m²     Physical Exam  HENT:      Head: Normocephalic and atraumatic.      Nose: Nose normal.      Mouth/Throat:      Mouth: Mucous membranes are moist.      Pharynx: No oropharyngeal exudate.   Eyes:      Extraocular Movements: Extraocular movements intact.      Conjunctiva/sclera: Conjunctivae normal.      Pupils: Pupils are equal, round, and reactive to light.   Cardiovascular:      Rate and Rhythm: Normal rate and regular rhythm.   Pulmonary:      Effort: Pulmonary effort is normal.      Breath sounds: Normal breath sounds.   Abdominal:      General: There is no distension.      Palpations: Abdomen is soft.   Musculoskeletal:      Cervical back: Normal range of motion and neck supple.   Lymphadenopathy:      Cervical: No cervical adenopathy.   Neurological:      General: No focal deficit present.      Mental Status: She is alert.   Psychiatric:         Attention and Perception: Attention normal.         Speech: Speech normal.         Behavior: Behavior is cooperative.     Patterned erythematous scaly lesions posterior proximal bilateral arms    Assessment/Plan   Problem List Items Addressed This Visit    None  Visit " Diagnoses         Codes    Contact dermatitis due to chemicals    -  Primary L25.3    Relevant Medications    clobetasol (Temovate) 0.05 % ointment        Avoid those adhesive patches for your continuous glucose monitor in the future as you are already doing  Risk benefits discussed and refill medication  Showed patient what patches we have here and she does not think that would work  Recheck 1 week if not better sooner if worse

## 2025-01-30 DIAGNOSIS — E11.65 TYPE 2 DIABETES MELLITUS WITH HYPERGLYCEMIA, WITHOUT LONG-TERM CURRENT USE OF INSULIN: ICD-10-CM

## 2025-02-04 DIAGNOSIS — E11.65 TYPE 2 DIABETES MELLITUS WITH HYPERGLYCEMIA, WITHOUT LONG-TERM CURRENT USE OF INSULIN: Primary | ICD-10-CM

## 2025-02-04 RX ORDER — BLOOD-GLUCOSE SENSOR
1 EACH MISCELLANEOUS
Qty: 6 EACH | Refills: 3 | Status: SHIPPED | OUTPATIENT
Start: 2025-02-04

## 2025-02-05 RX ORDER — BLOOD-GLUCOSE SENSOR
EACH MISCELLANEOUS
Qty: 3 EACH | Refills: 1 | Status: CANCELLED | OUTPATIENT
Start: 2025-02-05

## 2025-02-26 ENCOUNTER — TELEPHONE (OUTPATIENT)
Dept: PRIMARY CARE | Facility: CLINIC | Age: 50
End: 2025-02-26
Payer: COMMERCIAL

## 2025-02-26 DIAGNOSIS — B37.2 CANDIDIASIS, INTERTRIGINOUS: ICD-10-CM

## 2025-02-26 RX ORDER — NYSTATIN 100000 [USP'U]/G
1 POWDER TOPICAL 3 TIMES DAILY PRN
Qty: 60 G | Refills: 2 | Status: SHIPPED | OUTPATIENT
Start: 2025-02-26

## 2025-02-26 NOTE — TELEPHONE ENCOUNTER
MEDICATION REFILL    Pharmacy Name:    / [facility]  Medication requested           Nystatin   100,000 unit/gram powder  Dosage   1 application 3x daily prn   Quantity    90 days    Allergies      none given  Date of last visit   01/25/2025  Date of Next Appointment   03/14/2025

## 2025-02-28 ENCOUNTER — TELEPHONE (OUTPATIENT)
Dept: PRIMARY CARE | Facility: CLINIC | Age: 50
End: 2025-02-28

## 2025-02-28 ENCOUNTER — TELEPHONE (OUTPATIENT)
Dept: PREADMISSION TESTING | Facility: HOSPITAL | Age: 50
End: 2025-02-28
Payer: COMMERCIAL

## 2025-02-28 DIAGNOSIS — E11.65 TYPE 2 DIABETES MELLITUS WITH HYPERGLYCEMIA, WITHOUT LONG-TERM CURRENT USE OF INSULIN: ICD-10-CM

## 2025-02-28 NOTE — TELEPHONE ENCOUNTER
PT  is asking if Sheldon could send in a script for golytely / pertaining to the message Sheldon has recently commented on.

## 2025-02-28 NOTE — TELEPHONE ENCOUNTER
Pre-procedure PAT phone assessment completed. Pre-operative and medication instructions reviewed with patient. Pt states Dr. Norman instructed her on preop insulin dosage. Instructed pt to hold powder and cream dos. Colonoscopy prep reviewed. Patient verbalizes understanding of instructions.  SURGERY PRE-OPERATIVE INSTRUCTIONS    *You will receive a phone call the day before your procedure  after 2pm, (or the Friday before your surgery if scheduled on a Monday.) Generally the hospital will be calling you with this information after that time.    *You are not to eat after midnight the night before the surgery. You may have up to 13 ounces of clear liquids up until 2 hours prior to arriving to the hospital. The exception is with medications you were instructed to take day of surgery.    *You may take tylenol for pain/discomfort as needed.     *Stop taking all aspirin products, ibuprofen (motrin/advil), naproxen (aleve/naprosyn) for one week prior to surgery.    *Stop taking all vitamins and supplements one week prior to surgery.     *You should not have alcoholic beverages for 24 hours before surgery.     *You should not smoke 24 hours prior to surgery.     *To help prevent surgical infections bathe/shower with Dial soap the evening before surgery.    *You can wear deodorant but no lotion, powder, or perfume/cologne. You should remove all make-up and nail polish at home.    *If you wear glasses, please bring a case for the glasses with you.    *You will be asked to remove dentures and contacts.     *Please leave all valuables at home.    *You should wear loose, comfortable clothing that will accommodate bandages and/or casts.    *You should notify your doctor of any change in your condition (fever, cold, rash, etc). Surgery may need to be re-scheduled until a time you are in better health.    *A responsible adult is required to accompany you to and from the hospital if you are receiving anesthesia or a sedative.  Patients are not permitted to drive for 24 hours after anesthesia.     *You can use the [employer] parking if you wish.     *If you have any further questions please call [phone].

## 2025-03-03 ENCOUNTER — ANESTHESIA EVENT (OUTPATIENT)
Dept: OPERATING ROOM | Facility: HOSPITAL | Age: 50
End: 2025-03-03
Payer: COMMERCIAL

## 2025-03-04 ENCOUNTER — HOSPITAL ENCOUNTER (OUTPATIENT)
Dept: OPERATING ROOM | Facility: HOSPITAL | Age: 50
Setting detail: OUTPATIENT SURGERY
Discharge: HOME | End: 2025-03-04
Payer: COMMERCIAL

## 2025-03-04 ENCOUNTER — ANESTHESIA (OUTPATIENT)
Dept: OPERATING ROOM | Facility: HOSPITAL | Age: 50
End: 2025-03-04
Payer: COMMERCIAL

## 2025-03-04 VITALS
RESPIRATION RATE: 16 BRPM | SYSTOLIC BLOOD PRESSURE: 112 MMHG | HEART RATE: 73 BPM | TEMPERATURE: 98.8 F | OXYGEN SATURATION: 99 % | WEIGHT: 239.2 LBS | HEIGHT: 63 IN | BODY MASS INDEX: 42.38 KG/M2 | DIASTOLIC BLOOD PRESSURE: 68 MMHG

## 2025-03-04 DIAGNOSIS — Z12.11 SCREENING FOR COLON CANCER: ICD-10-CM

## 2025-03-04 LAB
GLUCOSE BLD MANUAL STRIP-MCNC: 102 MG/DL (ref 74–99)
GLUCOSE BLD MANUAL STRIP-MCNC: 83 MG/DL (ref 74–99)
PREGNANCY TEST URINE, POC: NEGATIVE

## 2025-03-04 PROCEDURE — 45378 DIAGNOSTIC COLONOSCOPY: CPT | Performed by: INTERNAL MEDICINE

## 2025-03-04 PROCEDURE — 3700000001 HC GENERAL ANESTHESIA TIME - INITIAL BASE CHARGE: Performed by: ANESTHESIOLOGY

## 2025-03-04 PROCEDURE — 82947 ASSAY GLUCOSE BLOOD QUANT: CPT

## 2025-03-04 PROCEDURE — A45378 PR COLONOSCOPY,DIAGNOSTIC: Performed by: NURSE ANESTHETIST, CERTIFIED REGISTERED

## 2025-03-04 PROCEDURE — 3700000002 HC GENERAL ANESTHESIA TIME - EACH INCREMENTAL 1 MINUTE: Performed by: ANESTHESIOLOGY

## 2025-03-04 PROCEDURE — 3600000002 HC OR TIME - INITIAL BASE CHARGE - PROCEDURE LEVEL TWO: Performed by: ANESTHESIOLOGY

## 2025-03-04 PROCEDURE — 3600000007 HC OR TIME - EACH INCREMENTAL 1 MINUTE - PROCEDURE LEVEL TWO: Performed by: ANESTHESIOLOGY

## 2025-03-04 PROCEDURE — 7100000010 HC PHASE TWO TIME - EACH INCREMENTAL 1 MINUTE: Performed by: ANESTHESIOLOGY

## 2025-03-04 PROCEDURE — A45378 PR COLONOSCOPY,DIAGNOSTIC: Performed by: ANESTHESIOLOGY

## 2025-03-04 PROCEDURE — 81025 URINE PREGNANCY TEST: CPT | Performed by: ANESTHESIOLOGY

## 2025-03-04 PROCEDURE — 2500000004 HC RX 250 GENERAL PHARMACY W/ HCPCS (ALT 636 FOR OP/ED): Performed by: NURSE ANESTHETIST, CERTIFIED REGISTERED

## 2025-03-04 PROCEDURE — 2500000004 HC RX 250 GENERAL PHARMACY W/ HCPCS (ALT 636 FOR OP/ED): Performed by: ANESTHESIOLOGY

## 2025-03-04 PROCEDURE — 7100000009 HC PHASE TWO TIME - INITIAL BASE CHARGE: Performed by: ANESTHESIOLOGY

## 2025-03-04 RX ORDER — DROPERIDOL 2.5 MG/ML
0.62 INJECTION, SOLUTION INTRAMUSCULAR; INTRAVENOUS ONCE AS NEEDED
Status: DISCONTINUED | OUTPATIENT
Start: 2025-03-04 | End: 2025-03-05 | Stop reason: HOSPADM

## 2025-03-04 RX ORDER — ONDANSETRON HYDROCHLORIDE 2 MG/ML
4 INJECTION, SOLUTION INTRAVENOUS ONCE AS NEEDED
Status: DISCONTINUED | OUTPATIENT
Start: 2025-03-04 | End: 2025-03-05 | Stop reason: HOSPADM

## 2025-03-04 RX ORDER — PROPOFOL 10 MG/ML
INJECTION, EMULSION INTRAVENOUS AS NEEDED
Status: DISCONTINUED | OUTPATIENT
Start: 2025-03-04 | End: 2025-03-04

## 2025-03-04 RX ORDER — SODIUM CHLORIDE, SODIUM LACTATE, POTASSIUM CHLORIDE, CALCIUM CHLORIDE 600; 310; 30; 20 MG/100ML; MG/100ML; MG/100ML; MG/100ML
20 INJECTION, SOLUTION INTRAVENOUS CONTINUOUS
Status: DISCONTINUED | OUTPATIENT
Start: 2025-03-04 | End: 2025-03-05 | Stop reason: HOSPADM

## 2025-03-04 RX ADMIN — PROPOFOL 20 MG: 10 INJECTION, EMULSION INTRAVENOUS at 09:41

## 2025-03-04 RX ADMIN — PROPOFOL 140 MCG/KG/MIN: 10 INJECTION, EMULSION INTRAVENOUS at 09:40

## 2025-03-04 RX ADMIN — PROPOFOL 20 MG: 10 INJECTION, EMULSION INTRAVENOUS at 09:46

## 2025-03-04 RX ADMIN — SODIUM CHLORIDE, POTASSIUM CHLORIDE, SODIUM LACTATE AND CALCIUM CHLORIDE 20 ML/HR: 600; 310; 30; 20 INJECTION, SOLUTION INTRAVENOUS at 07:51

## 2025-03-04 RX ADMIN — PROPOFOL 40 MG: 10 INJECTION, EMULSION INTRAVENOUS at 09:39

## 2025-03-04 SDOH — HEALTH STABILITY: MENTAL HEALTH: CURRENT SMOKER: 0

## 2025-03-04 ASSESSMENT — PAIN SCALES - GENERAL: PAIN_LEVEL: 0

## 2025-03-04 NOTE — ANESTHESIA POSTPROCEDURE EVALUATION
Patient: Helga Sampson    Procedure Summary       Date: 03/04/25 Room / Location: [facility] OR    Anesthesia Start: 0931 Anesthesia Stop: 1003    Procedure: COLONOSCOPY Diagnosis: Screening for colon cancer    Scheduled Providers: Wanda Gamboa MD; Jose Alejandro Negrete MD Responsible Provider: Jose Alejandro Negrete MD    Anesthesia Type: MAC ASA Status: 3            Anesthesia Type: MAC    Vitals Value Taken Time   /43 03/04/25 1003   Temp 37.1 03/04/25 1003   Pulse 72 03/04/25 1003   Resp 16 03/04/25 1003   SpO2 100 03/04/25 1003       Anesthesia Post Evaluation    Patient location during evaluation: PACU  Patient participation: complete - patient participated  Level of consciousness: awake and alert  Pain score: 0  Pain management: adequate  Airway patency: patent  Two or more strategies used to mitigate risk of obstructive sleep apnea  Cardiovascular status: acceptable and stable  Respiratory status: acceptable and nasal cannula  Hydration status: acceptable  Postoperative Nausea and Vomiting: none        There were no known notable events for this encounter.

## 2025-03-04 NOTE — ANESTHESIA PREPROCEDURE EVALUATION
Patient: Helga Sampson    Procedure Information       Date/Time: 03/04/25 0830    Scheduled providers: Wanda Gamboa MD; Jose Alejandro Negrete MD    Procedure: COLONOSCOPY    Location: [facility] OR          Vitals:    03/04/25 0729   BP: 138/79   Pulse: 79   Resp: 16   Temp: 36.2 °C (97.2 °F)   SpO2: 100%       Past Surgical History:   Procedure Laterality Date    NO PAST SURGERIES       Past Medical History:   Diagnosis Date    Adjustment disorder     Class 3 severe obesity with body mass index (BMI) of 45.0 to 49.9 in adult 01/25/2025    Cough 02/06/2019    Hyperlipidemia     Insomnia     Nasal congestion 02/06/2019    Screening for colon cancer 03/04/2025    Type 2 diabetes mellitus     Wears glasses        Current Outpatient Medications:     alcohol swabs pads, medicated, Check once daily, as instructed: fasting glucose at least once a week, and post-prandial 2-4 hours after a meal once daily other days. May dispense formulary equivalent. Dx: DM-2 (E11.9)., Disp: 100 each, Rfl: 3    atorvastatin (Lipitor) 10 mg tablet, Take 1 tablet (10 mg) by mouth once daily at bedtime., Disp: 100 tablet, Rfl: 0    blood sugar diagnostic (Blood Glucose Test) strip, Check once daily, as instructed: fasting glucose at least once a week, and post-prandial 2-4 hours after a meal once daily other days. May dispense formulary equivalent. Dx: DM-2 (E11.9)., Disp: 100 strip, Rfl: 3    blood-glucose meter misc, Check once daily, as instructed: fasting glucose at least once a week, and post-prandial 2-4 hours after a meal once daily other days. May dispense formulary equivalent. Dx: DM-2 (E11.9)., Disp: 1 each, Rfl: 0    blood-glucose sensor (FreeStyle Arely 2 Plus Sensor) device, 1 each every 14 (fourteen) days., Disp: 6 each, Rfl: 3    clobetasol (Temovate) 0.05 % ointment, Apply topically 2 times a day as needed (rash, irritation). Use up to one week at a time, Disp: 45 g, Rfl: 0    flash glucose sensor kit  "(FreeStyle Arely 2 Sensor) kit, USE AS DIRECTED, Disp: 6 each, Rfl: 3    insulin degludec (Tresiba FlexTouch U-100) 100 unit/mL (3 mL) injection, Inject 16 Units under the skin once daily at bedtime. Or as directed. Max 30 units. (Patient taking differently: Inject 16 Units under the skin once daily at bedtime. Or as directed. Max 30 units.   Pt states she takes 30units nightly), Disp: 9 mL, Rfl: 2    lancets (Lancets,Ultra Thin) misc, Check once daily, as instructed: fasting glucose at least once a week, and post-prandial 2-4 hours after a meal once daily other days. May dispense formulary equivalent. Dx: DM-2 (E11.9)., Disp: 100 each, Rfl: 3    pen needle, diabetic 32 gauge x 5/32\" needle, 1 each once daily at bedtime., Disp: 100 each, Rfl: 3    traZODone (Desyrel) 50 mg tablet, Take 0.5-1 tablets (25-50 mg) by mouth as needed at bedtime for sleep., Disp: 30 tablet, Rfl: 2    nystatin (Mycostatin) 100,000 unit/gram powder, Apply 1 Application topically 3 times a day as needed for other (skin yeast infection)., Disp: 60 g, Rfl: 2    Current Facility-Administered Medications:     lactated Ringer's infusion, 20 mL/hr, intravenous, Continuous, Jose Alejandro Negrete MD, Last Rate: 20 mL/hr at 03/04/25 0751, 20 mL/hr at 03/04/25 0751  Prior to Admission medications    Medication Sig Start Date End Date Taking? Authorizing Provider   alcohol swabs pads, medicated Check once daily, as instructed: fasting glucose at least once a week, and post-prandial 2-4 hours after a meal once daily other days. May dispense formulary equivalent. Dx: DM-2 (E11.9). 12/12/24  Yes Roderick Norman DO   atorvastatin (Lipitor) 10 mg tablet Take 1 tablet (10 mg) by mouth once daily at bedtime. 12/12/24  Yes Roderick Norman DO   blood sugar diagnostic (Blood Glucose Test) strip Check once daily, as instructed: fasting glucose at least once a week, and post-prandial 2-4 hours after a meal once daily other days. May dispense formulary " "equivalent. Dx: DM-2 (E11.9). 12/12/24  Yes Roderick Norman, DO   blood-glucose meter misc Check once daily, as instructed: fasting glucose at least once a week, and post-prandial 2-4 hours after a meal once daily other days. May dispense formulary equivalent. Dx: DM-2 (E11.9). 12/12/24  Yes Roderick Norman, DO   blood-glucose sensor (FreeStyle Arely 2 Plus Sensor) device 1 each every 14 (fourteen) days. 2/4/25  Yes Roderick Norman, DO   clobetasol (Temovate) 0.05 % ointment Apply topically 2 times a day as needed (rash, irritation). Use up to one week at a time 1/25/25  Yes Jamie Burkett MD   flash glucose sensor kit (FreeStyle Arely 2 Sensor) kit USE AS DIRECTED 12/17/24  Yes Roderick Norman, DO   insulin degludec (Tresiba FlexTouch U-100) 100 unit/mL (3 mL) injection Inject 16 Units under the skin once daily at bedtime. Or as directed. Max 30 units.  Patient taking differently: Inject 16 Units under the skin once daily at bedtime. Or as directed. Max 30 units.   Pt states she takes 30units nightly 1/13/25  Yes Roderick Norman, DO   lancets (Lancets,Ultra Thin) misc Check once daily, as instructed: fasting glucose at least once a week, and post-prandial 2-4 hours after a meal once daily other days. May dispense formulary equivalent. Dx: DM-2 (E11.9). 12/12/24  Yes Roderick Norman, DO   pen needle, diabetic 32 gauge x 5/32\" needle 1 each once daily at bedtime. 12/11/24  Yes Roderick Norman, DO   traZODone (Desyrel) 50 mg tablet Take 0.5-1 tablets (25-50 mg) by mouth as needed at bedtime for sleep. 12/16/24  Yes Roderick Norman, DO   nystatin (Mycostatin) 100,000 unit/gram powder Apply 1 Application topically 3 times a day as needed for other (skin yeast infection). 2/26/25   Roderick Norman, DO   nystatin (Mycostatin) 100,000 unit/gram powder Apply 1 Application topically 3 times a day as needed for other (skin yeast infection). 12/12/24 2/26/25  Roderick Norman DO     Allergies   Allergen " "Reactions    Shellfish Containing Products Unknown     Social History     Tobacco Use    Smoking status: Never    Smokeless tobacco: Never   Substance Use Topics    Alcohol use: Never         Chemistry    Lab Results   Component Value Date/Time     12/09/2024 0704    K 3.8 12/09/2024 0704    CL 97 (L) 12/09/2024 0704    CO2 27 12/09/2024 0704    BUN 10 12/09/2024 0704    CREATININE 0.59 12/09/2024 0704    Lab Results   Component Value Date/Time    CALCIUM 9.0 12/09/2024 0704    ALKPHOS 90 12/09/2024 0704    AST 13 12/09/2024 0704    ALT 19 12/09/2024 0704    BILITOT 0.6 12/09/2024 0704          Lab Results   Component Value Date/Time    WBC 7.7 12/09/2024 0704    HGB 15.9 12/09/2024 0704    HCT 50.2 (H) 12/09/2024 0704     12/09/2024 0704     No results found for: \"PROTIME\", \"PTT\", \"INR\"  No results found for this or any previous visit (from the past 4464 hours).  No results found for this or any previous visit from the past 1095 days.      Relevant Problems   Endocrine   (+) Type 2 diabetes mellitus with hyperglycemia, without long-term current use of insulin      ID   (+) Candidiasis, intertriginous       Clinical information reviewed:   Tobacco  Allergies  Meds   Med Hx  Surg Hx  OB Status  Fam Hx  Soc   Hx        NPO Detail:  NPO/Void Status  Carbohydrate Drink Given Prior to Surgery? : N  Date of Last Liquid: 03/04/25  Time of Last Liquid: 0315  Date of Last Solid: 03/02/25  Time of Last Solid: 1700  Last Intake Type: Clear fluids  Time of Last Void: 0700         Physical Exam    Airway  Mallampati: II     Cardiovascular - normal exam     Dental    Pulmonary    Abdominal            Anesthesia Plan    History of general anesthesia?: yes  History of complications of general anesthesia?: no    ASA 3     MAC     The patient is not a current smoker.  Patient was not previously instructed to abstain from smoking on day of procedure.  Patient did not smoke on day of procedure.  Education provided " regarding risk of obstructive sleep apnea.  intravenous induction   Anesthetic plan and risks discussed with patient.    Plan discussed with CRNA.

## 2025-03-04 NOTE — DISCHARGE INSTRUCTIONS
Patient Instructions after a Colonoscopy      The anesthetics, sedatives or narcotics which were given to you today will be acting in your body for the next 24 hours, so you might feel a little sleepy or groggy.  This feeling should slowly wear off. Carefully read and follow the instructions.     You received sedation today:  - Do not drive or operate any machinery or power tools of any kind.   - No alcoholic beverages today, not even beer or wine.  - Do not make any important decisions or sign any legal documents.  - No over the counter medications that contain alcohol or that may cause drowsiness.  - Do not make any important decisions or sign any legal documents.  - Make sure you have someone with you for first 24 hours.    While it is common to experience mild to moderate abdominal distention, gas, or belching after your procedure, if any of these symptoms occur following discharge from the GI Lab or within one week of having your procedure, call the [facility] to be advised whether a visit to your nearest Urgent Care or Emergency Department is indicated.  Take this paper with you if you go.     - If you develop an allergic reaction to the medications that were given during your procedure such as difficulty breathing, rash, hives, severe nausea, vomiting or lightheadedness.  - If you experience chest pain, shortness of breath, severe abdominal pain, fevers and chills.  -If you develop signs and symptoms of bleeding such as blood in your spit, if your stools turn black, tarry, or bloody  - If you have not urinated within 8 hours following your procedure.  - If your IV site becomes painful, red, inflamed, or looks infected.    If you received a biopsy/polypectomy/sphincterotomy the following instructions apply below:    __ Do not use Aspirin containing products, non-steroidal medications or anti-coagulants for one week following your procedure. (Examples of these types of medications are: Advil,  Arthrotec, Aleve, Coumadin, Ecotrin, Heparin, Ibuprofen, Indocin, Motrin, Naprosyn, Nuprin, Plavix, Vioxx, and Voltarin, or their generic forms.  This list is not all-inclusive.  Check with your physician or pharmacist before resuming medications.)   __ Eat a soft diet today.  Avoid foods that are poorly digested for the next 24 hours.  These foods would include: nuts, beans, lettuce, red meats, and fried foods. Start with liquids and advance your diet as tolerated, gradually work up to eating solids.   __ Do not have a Barium Study or Enema for one week.    Your physician recommends the additional following instructions:    -You have a contact number available for emergencies. The signs and symptoms of potential delayed complications were discussed with you. You may return to normal activities tomorrow.  -Resume your previous diet.  -Continue your present medications.   -We are waiting for your pathology results.  -Your physician has recommended a repeat colonoscopy (date to be determined after pending pathology results are reviewed) for surveillance based on pathology results.  -The findings and recommendations have been discussed with you.  -The findings and recommendations were discussed with your family.  - Please see Medication Reconciliation Form for new medication/medications prescribed.       If you experience any problems or have any questions following discharge from the GI Lab, please call:        Nurse Signature                                                                        Date___________________                                                                            Patient/Responsible Party Signature                                        Date___________________

## 2025-03-05 ENCOUNTER — TELEPHONE (OUTPATIENT)
Dept: GASTROENTEROLOGY | Facility: CLINIC | Age: 50
End: 2025-03-05
Payer: COMMERCIAL

## 2025-03-05 ENCOUNTER — TELEPHONE (OUTPATIENT)
Dept: PRIMARY CARE | Facility: CLINIC | Age: 50
End: 2025-03-05
Payer: COMMERCIAL

## 2025-03-05 NOTE — TELEPHONE ENCOUNTER
Called pt to schedule fu. She is scheduled for May. Pt states she is experiencing vaginal bleeding after her Colonoscopy yesterday and does not get a period until the end of the month usually. That it is not from a menstrual cycle. Please advise If that is normal.

## 2025-03-05 NOTE — TELEPHONE ENCOUNTER
Patient called this morning- states she had a colonoscopy yesterday and now has vaginal bleeding not related to her menstrual cycle. She confirmed it is vaginal and not rectal. She does not see a gastro and is wondering if she should be concerned.

## 2025-03-05 NOTE — TELEPHONE ENCOUNTER
Spoke with patient, states she is not concerned, has appt here on 3/14 asked if she can wait until then, she feels good, offered to get her scheduled, see gyn, can take wait and see approach if things get worse she can make appt to be seen

## 2025-03-07 ASSESSMENT — ENCOUNTER SYMPTOMS
VISUAL CHANGE: 0
HEADACHES: 0
FATIGUE: 0
POLYDIPSIA: 0
WEIGHT LOSS: 0
TREMORS: 0
NERVOUS/ANXIOUS: 0
DIZZINESS: 0
SEIZURES: 0
BLURRED VISION: 0
SPEECH DIFFICULTY: 0
CONFUSION: 0
SWEATS: 0
HUNGER: 0
WEAKNESS: 0
BLACKOUTS: 0
POLYPHAGIA: 0

## 2025-03-09 DIAGNOSIS — F51.04 PSYCHOPHYSIOLOGICAL INSOMNIA: ICD-10-CM

## 2025-03-11 ENCOUNTER — TELEPHONE (OUTPATIENT)
Dept: PRIMARY CARE | Facility: CLINIC | Age: 50
End: 2025-03-11
Payer: COMMERCIAL

## 2025-03-11 DIAGNOSIS — F51.04 PSYCHOPHYSIOLOGICAL INSOMNIA: ICD-10-CM

## 2025-03-11 LAB
ALBUMIN SERPL-MCNC: 4.1 G/DL (ref 3.6–5.1)
ALP SERPL-CCNC: 62 U/L (ref 37–153)
ALT SERPL-CCNC: 20 U/L (ref 6–29)
ANION GAP SERPL CALCULATED.4IONS-SCNC: 9 MMOL/L (CALC) (ref 7–17)
AST SERPL-CCNC: 17 U/L (ref 10–35)
BILIRUB SERPL-MCNC: 0.4 MG/DL (ref 0.2–1.2)
BUN SERPL-MCNC: 14 MG/DL (ref 7–25)
CALCIUM SERPL-MCNC: 9.4 MG/DL (ref 8.6–10.4)
CHLORIDE SERPL-SCNC: 105 MMOL/L (ref 98–110)
CHOLEST SERPL-MCNC: 106 MG/DL
CHOLEST/HDLC SERPL: 2.1 (CALC)
CO2 SERPL-SCNC: 26 MMOL/L (ref 20–32)
CREAT SERPL-MCNC: 0.63 MG/DL (ref 0.5–1.03)
EGFRCR SERPLBLD CKD-EPI 2021: 107 ML/MIN/1.73M2
EST. AVERAGE GLUCOSE BLD GHB EST-MCNC: 134 MG/DL
EST. AVERAGE GLUCOSE BLD GHB EST-SCNC: 7.4 MMOL/L
GLUCOSE SERPL-MCNC: 81 MG/DL (ref 65–99)
HBA1C MFR BLD: 6.3 % OF TOTAL HGB
HDLC SERPL-MCNC: 50 MG/DL
LDLC SERPL CALC-MCNC: 42 MG/DL (CALC)
NONHDLC SERPL-MCNC: 56 MG/DL (CALC)
POTASSIUM SERPL-SCNC: 4.2 MMOL/L (ref 3.5–5.3)
PROT SERPL-MCNC: 6.9 G/DL (ref 6.1–8.1)
SODIUM SERPL-SCNC: 140 MMOL/L (ref 135–146)
TRIGL SERPL-MCNC: 68 MG/DL

## 2025-03-11 RX ORDER — TRAZODONE HYDROCHLORIDE 50 MG/1
TABLET ORAL
Qty: 30 TABLET | Refills: 0 | Status: SHIPPED | OUTPATIENT
Start: 2025-03-11 | End: 2025-03-11 | Stop reason: SDUPTHER

## 2025-03-11 NOTE — TELEPHONE ENCOUNTER
Rx for Trazodone was just sent in for the patient- but she is requesting a 90 day supply per insurance or else they wont cover it.

## 2025-03-13 RX ORDER — TRAZODONE HYDROCHLORIDE 50 MG/1
50 TABLET ORAL NIGHTLY
Qty: 90 TABLET | Refills: 1 | Status: SHIPPED | OUTPATIENT
Start: 2025-03-13 | End: 2025-03-14 | Stop reason: SDUPTHER

## 2025-03-14 ENCOUNTER — APPOINTMENT (OUTPATIENT)
Dept: PRIMARY CARE | Facility: CLINIC | Age: 50
End: 2025-03-14
Payer: COMMERCIAL

## 2025-03-14 VITALS
HEART RATE: 97 BPM | BODY MASS INDEX: 43.22 KG/M2 | OXYGEN SATURATION: 97 % | WEIGHT: 244 LBS | DIASTOLIC BLOOD PRESSURE: 75 MMHG | SYSTOLIC BLOOD PRESSURE: 119 MMHG

## 2025-03-14 DIAGNOSIS — E78.00 HYPERCHOLESTEROLEMIA: ICD-10-CM

## 2025-03-14 DIAGNOSIS — L25.3 CONTACT DERMATITIS DUE TO CHEMICALS: ICD-10-CM

## 2025-03-14 DIAGNOSIS — E11.9 TYPE 2 DIABETES MELLITUS WITHOUT COMPLICATION, WITHOUT LONG-TERM CURRENT USE OF INSULIN (MULTI): Primary | ICD-10-CM

## 2025-03-14 DIAGNOSIS — F51.04 PSYCHOPHYSIOLOGICAL INSOMNIA: ICD-10-CM

## 2025-03-14 PROCEDURE — 1036F TOBACCO NON-USER: CPT | Performed by: FAMILY MEDICINE

## 2025-03-14 PROCEDURE — 3074F SYST BP LT 130 MM HG: CPT | Performed by: FAMILY MEDICINE

## 2025-03-14 PROCEDURE — 3078F DIAST BP <80 MM HG: CPT | Performed by: FAMILY MEDICINE

## 2025-03-14 PROCEDURE — 99214 OFFICE O/P EST MOD 30 MIN: CPT | Performed by: FAMILY MEDICINE

## 2025-03-14 RX ORDER — TRAZODONE HYDROCHLORIDE 50 MG/1
50 TABLET ORAL NIGHTLY
Qty: 100 TABLET | Refills: 3 | Status: SHIPPED | OUTPATIENT
Start: 2025-03-14

## 2025-03-14 RX ORDER — BLOOD-GLUCOSE SENSOR
1 EACH MISCELLANEOUS
Qty: 6 EACH | Refills: 3 | Status: SHIPPED | OUTPATIENT
Start: 2025-03-14

## 2025-03-14 RX ORDER — ATORVASTATIN CALCIUM 10 MG/1
10 TABLET, FILM COATED ORAL NIGHTLY
Qty: 100 TABLET | Refills: 3 | Status: SHIPPED | OUTPATIENT
Start: 2025-03-14

## 2025-03-14 RX ORDER — CLOBETASOL PROPIONATE 0.5 MG/G
OINTMENT TOPICAL 2 TIMES DAILY PRN
Qty: 60 G | Refills: 1 | Status: SHIPPED | OUTPATIENT
Start: 2025-03-14

## 2025-03-14 ASSESSMENT — ENCOUNTER SYMPTOMS
POLYPHAGIA: 0
BLACKOUTS: 0
WEIGHT LOSS: 0
HUNGER: 0
NERVOUS/ANXIOUS: 0
DIZZINESS: 0
SPEECH DIFFICULTY: 0
HEADACHES: 0
CONFUSION: 0
FATIGUE: 0
SWEATS: 0
VISUAL CHANGE: 0
POLYDIPSIA: 0
TREMORS: 0
BLURRED VISION: 0
SEIZURES: 0
WEAKNESS: 0

## 2025-03-14 NOTE — ASSESSMENT & PLAN NOTE
Impressive improvement. Continue current regimen. Reduce insulin if starting to get lows.  Orders:    Follow Up In Primary Care    blood-glucose sensor (FreeStyle Arely 2 Plus Sensor) device; 1 each every 14 (fourteen) days.    Follow Up In Primary Care - Established; Future

## 2025-03-14 NOTE — PROGRESS NOTES
Subjective   Patient ID: Helga Sampson is a 49 y.o. female who presents for Follow-up (Pt presents for 3 month check up, no concerns, rx needed 90 days per insurance.).  Diabetes  She has type 2 diabetes mellitus. MedicAlert identification noted. The initial diagnosis of diabetes was made 3 months ago. Pertinent negatives for hypoglycemia include no confusion, dizziness, headaches, hunger, mood changes, nervousness/anxiousness, pallor, seizures, sleepiness, speech difficulty, sweats or tremors. Pertinent negatives for diabetes include no blurred vision, no chest pain, no fatigue, no foot paresthesias, no foot ulcerations, no polydipsia, no polyphagia, no polyuria, no visual change, no weakness and no weight loss. Pertinent negatives for hypoglycemia complications include no blackouts, no hospitalization, no nocturnal hypoglycemia, no required assistance and no required glucagon injection. Symptoms are improving. Pertinent negatives for diabetic complications include no CVA, heart disease, impotence, nephropathy, peripheral neuropathy, PVD or retinopathy. Current diabetic treatment includes insulin injections. She is compliant with treatment all of the time. She is currently taking insulin at bedtime. Insulin injections are given by patient. Rotation sites for injection include the abdominal wall. Her weight is stable. She is following a diabetic, generally healthy, high fiber, low fat/cholesterol and low salt diet. When asked about meal planning, she reported none and avoidance of concentrated sweets. Meal planning includes none and avoidance of concentrated sweets. She has had a previous visit with a dietitian. She participates in exercise daily. She monitors blood glucose at home 3-4 x per day. Blood glucose monitoring compliance is excellent. Her breakfast blood glucose is taken between 7-8 am. Her breakfast blood glucose range is generally 70-90 mg/dl. Her lunch blood glucose is taken between 11-12 pm. Her lunch  "blood glucose range is generally 70-90 mg/dl. Her dinner blood glucose is taken between 4-5 pm. Her dinner blood glucose range is generally 70-90 mg/dl. Her bedtime blood glucose is taken between 8-9 pm. Her bedtime blood glucose range is generally 70-90 mg/dl. Her overall blood glucose range is  mg/dl. She does not see a podiatrist.Eye exam is current.    Historian(s): Self    Generally feeling well.    Reports low of about 86, some lower she attributes to \"compression lows\" from laying on the probe.  Seeing eye doctor at least annually.    c/o rhinitis, Flonase helps.    Review of Systems   Constitutional:  Negative for fatigue and weight loss.   Eyes:  Negative for blurred vision.   Cardiovascular:  Negative for chest pain.   Endocrine: Negative for polydipsia, polyphagia and polyuria.   Genitourinary:  Negative for impotence.   Skin:  Negative for pallor.   Neurological:  Negative for dizziness, tremors, seizures, speech difficulty, weakness and headaches.   Psychiatric/Behavioral:  Negative for confusion. The patient is not nervous/anxious.    All other systems reviewed and are negative.    No LMP recorded.    Patient Care Team:  Roderick Norman DO as PCP - General (Family Medicine)    Prior to Admission medications    Medication Sig Start Date End Date Taking? Authorizing Provider   alcohol swabs pads, medicated Check once daily, as instructed: fasting glucose at least once a week, and post-prandial 2-4 hours after a meal once daily other days. May dispense formulary equivalent. Dx: DM-2 (E11.9). 12/12/24  Yes Roderick Norman DO   atorvastatin (Lipitor) 10 mg tablet Take 1 tablet (10 mg) by mouth once daily at bedtime. 12/12/24  Yes Roderick Norman DO   blood sugar diagnostic (Blood Glucose Test) strip Check once daily, as instructed: fasting glucose at least once a week, and post-prandial 2-4 hours after a meal once daily other days. May dispense formulary equivalent. Dx: DM-2 (E11.9). 12/12/24 " " Yes Roderick Norman, DO   blood-glucose meter misc Check once daily, as instructed: fasting glucose at least once a week, and post-prandial 2-4 hours after a meal once daily other days. May dispense formulary equivalent. Dx: DM-2 (E11.9). 12/12/24  Yes Roderick Norman DO   blood-glucose sensor (FreeStyle Arely 2 Plus Sensor) device 1 each every 14 (fourteen) days. 2/4/25  Yes Roderick Norman, DO   lancets (Lancets,Ultra Thin) misc Check once daily, as instructed: fasting glucose at least once a week, and post-prandial 2-4 hours after a meal once daily other days. May dispense formulary equivalent. Dx: DM-2 (E11.9). 12/12/24  Yes Roderick Norman DO   pen needle, diabetic 32 gauge x 5/32\" needle 1 each once daily at bedtime. 12/11/24  Yes Roderick Norman DO   traZODone (Desyrel) 50 mg tablet Take 1 tablet (50 mg) by mouth once daily at bedtime. TAKE 1/2 TO 1 (ONE-HALF TO ONE) TABLET BY MOUTH AS NEEDED AT BEDTIME FOR SLEEP 3/13/25  Yes Roderick Norman DO   clobetasol (Temovate) 0.05 % ointment Apply topically 2 times a day as needed (rash, irritation). Use up to one week at a time 1/25/25   Jamie Burkett MD   flash glucose sensor kit (FreeStyle Arely 2 Sensor) kit USE AS DIRECTED 12/17/24   Roderick Norman DO   insulin degludec (Tresiba FlexTouch U-100) 100 unit/mL (3 mL) injection Inject 16 Units under the skin once daily at bedtime. Or as directed. Max 30 units.  Patient taking differently: Inject 16 Units under the skin once daily at bedtime. Or as directed. Max 30 units.   Pt states she takes 30units nightly 1/13/25   Roderick Norman DO   nystatin (Mycostatin) 100,000 unit/gram powder Apply 1 Application topically 3 times a day as needed for other (skin yeast infection). 2/26/25   Roderick B Chirdon, DO   traZODone (Desyrel) 50 mg tablet Take 0.5-1 tablets (25-50 mg) by mouth as needed at bedtime for sleep. 12/16/24 3/11/25  Roderick Norman, DO   traZODone (Desyrel) 50 mg tablet TAKE 1/2 " TO 1 (ONE-HALF TO ONE) TABLET BY MOUTH AS NEEDED AT BEDTIME FOR SLEEP 3/11/25 3/11/25  Roderick Norman DO        Objective   /75   Pulse 97   Wt 111 kg (244 lb)   SpO2 97%   BMI 43.22 kg/m²     Lab Results   Component Value Date    HGBA1C 6.3 (H) 03/10/2025    HGBA1C 11.7 (H) 12/09/2024     Lab Results   Component Value Date    MICROALBCREA 30 - 300 (A) 12/12/2024      Lab Results   Component Value Date    LDLCALC 42 03/10/2025    LDLCALC 109 (H) 12/09/2024     Lab Results   Component Value Date    TRIG 68 03/10/2025    TRIG 200 (H) 12/09/2024      Lab Results   Component Value Date    CREATININE 0.63 03/10/2025    CREATININE 0.59 12/09/2024     Lab Results   Component Value Date    EGFR 107 03/10/2025    EGFR >90 12/09/2024          Physical Exam  Vitals and nursing note reviewed.   Constitutional:       General: She is not in acute distress.     Appearance: Normal appearance.      Comments: No assistive device presently being used.   HENT:      Head: Normocephalic and atraumatic.   Eyes:      General: No scleral icterus.     Extraocular Movements: Extraocular movements intact.      Conjunctiva/sclera: Conjunctivae normal.   Pulmonary:      Effort: Pulmonary effort is normal. No respiratory distress.   Feet:      Right foot:      Protective Sensation: 10 sites tested.  10 sites sensed.      Skin integrity: Skin integrity normal.      Left foot:      Protective Sensation: 10 sites tested.  10 sites sensed.      Skin integrity: Skin integrity normal.   Skin:     General: Skin is warm and dry.      Coloration: Skin is not jaundiced.   Neurological:      Mental Status: She is alert and oriented to person, place, and time. Mental status is at baseline.   Psychiatric:         Behavior: Behavior normal.         Assessment & Plan  Type 2 diabetes mellitus without complication, without long-term current use of insulin (Multi)  Impressive improvement. Continue current regimen. Reduce insulin if starting to get  heather.  Orders:    Follow Up In Primary Care    blood-glucose sensor (FreeStyle Arely 2 Plus Sensor) device; 1 each every 14 (fourteen) days.    Follow Up In Primary Care - Established; Future    Hypercholesterolemia  Well controlled.   Orders:    Follow Up In Primary Care    atorvastatin (Lipitor) 10 mg tablet; Take 1 tablet (10 mg) by mouth once daily at bedtime.    Contact dermatitis due to chemicals  Topical steroid PRN.  Orders:    clobetasol (Temovate) 0.05 % ointment; Apply topically 2 times a day as needed (rash, irritation). Use up to one week at a time    Psychophysiological insomnia  Well controlled.   Orders:    traZODone (Desyrel) 50 mg tablet; Take 1 tablet (50 mg) by mouth once daily at bedtime. TAKE 1/2 TO 1 (ONE-HALF TO ONE) TABLET BY MOUTH AS NEEDED AT BEDTIME FOR SLEEP

## 2025-03-14 NOTE — PATIENT INSTRUCTIONS
You may try Azelastine antihistamine nasal spray in combination with the Flonase/fluticasone nasal steroid spray.    Your A1c is improved to 6.3%, down from 11.7%. Excellent!!!    Please return for a(n) diabetes and medication follow-up appointment in 3 months, after tests to review results and options, earlier if any question or concern. Please schedule additional problem-focused appointment(s) to address additional problem(s).    Recommended vaccines:  Influenza, annual  Avoid taking Biotin (a vitamin, shows up particularly in hair/nail supplements) for a week prior to any blood tests, as it can interfere with certain results. Fasting for labs means 12 hours, nothing to eat or drink, except water and medications, unless directed otherwise.    For assistance with scheduling referrals or consultations, please call [phone]. For laboratory, radiology, and other tests, please call [phone] ([phone] for pediatrics). Please review prescription inserts and published information for possible adverse effects of all medications. Return after testing or consultation to review results and recommendations, if symptoms persist, change, worsen, or return, or if you have any question or concern. If you do not get results within 7-10 days, or you have any question or concern, please send a message, call the office ([phone]), or return to the office for a follow-up appointment. For non-emergencies, you may call the office. For emergencies, call 9-1-1 or go to the nearest Emergency Department. Please schedule additional appointment(s) to address concern(s) not addressed today. An annual Wellness visit is strongly recommended. A Wellness visit should be dedicated to addressing routine health maintenance matters (e.g., cancer screenings, cardiovascular screening, etc.). Problem-focused visits, typically prompted by symptoms or specific concerns, are usually conducted separately, particularly if multiple or complex  problems need to be addressed.    In general, results are not released or discussed over the telephone, but at an appointment or via  [employer]. Results of tests done through [facility] are released via  [employer] (see below).  [email or web address]   [employer] support line: [phone]

## 2025-03-14 NOTE — ASSESSMENT & PLAN NOTE
Well controlled.   Orders:    traZODone (Desyrel) 50 mg tablet; Take 1 tablet (50 mg) by mouth once daily at bedtime. TAKE 1/2 TO 1 (ONE-HALF TO ONE) TABLET BY MOUTH AS NEEDED AT BEDTIME FOR SLEEP

## 2025-03-26 DIAGNOSIS — B37.2 CANDIDIASIS, INTERTRIGINOUS: ICD-10-CM

## 2025-03-27 RX ORDER — NYSTATIN 100000 [USP'U]/G
POWDER TOPICAL
Qty: 120 G | Refills: 0 | Status: SHIPPED | OUTPATIENT
Start: 2025-03-27

## 2025-04-01 DIAGNOSIS — E11.65 TYPE 2 DIABETES MELLITUS WITH HYPERGLYCEMIA, WITHOUT LONG-TERM CURRENT USE OF INSULIN: ICD-10-CM

## 2025-04-04 RX ORDER — INSULIN DEGLUDEC 100 U/ML
INJECTION, SOLUTION SUBCUTANEOUS
Qty: 9 ML | Refills: 1 | Status: SHIPPED | OUTPATIENT
Start: 2025-04-04

## 2025-05-02 ENCOUNTER — OFFICE VISIT (OUTPATIENT)
Dept: PRIMARY CARE | Facility: CLINIC | Age: 50
End: 2025-05-02
Payer: COMMERCIAL

## 2025-05-02 VITALS
BODY MASS INDEX: 41.46 KG/M2 | OXYGEN SATURATION: 98 % | TEMPERATURE: 98.7 F | DIASTOLIC BLOOD PRESSURE: 75 MMHG | HEIGHT: 63 IN | SYSTOLIC BLOOD PRESSURE: 109 MMHG | WEIGHT: 234 LBS | HEART RATE: 74 BPM

## 2025-05-02 DIAGNOSIS — R07.89 CHEST PAIN, ATYPICAL: ICD-10-CM

## 2025-05-02 DIAGNOSIS — E11.65 TYPE 2 DIABETES MELLITUS WITH HYPERGLYCEMIA, WITHOUT LONG-TERM CURRENT USE OF INSULIN: Primary | ICD-10-CM

## 2025-05-02 DIAGNOSIS — E78.00 HYPERCHOLESTEROLEMIA: ICD-10-CM

## 2025-05-02 DIAGNOSIS — F51.04 PSYCHOPHYSIOLOGICAL INSOMNIA: ICD-10-CM

## 2025-05-02 PROCEDURE — 99214 OFFICE O/P EST MOD 30 MIN: CPT | Performed by: FAMILY MEDICINE

## 2025-05-02 PROCEDURE — 3074F SYST BP LT 130 MM HG: CPT | Performed by: FAMILY MEDICINE

## 2025-05-02 PROCEDURE — 3008F BODY MASS INDEX DOCD: CPT | Performed by: FAMILY MEDICINE

## 2025-05-02 PROCEDURE — 93000 ELECTROCARDIOGRAM COMPLETE: CPT | Performed by: FAMILY MEDICINE

## 2025-05-02 PROCEDURE — 3078F DIAST BP <80 MM HG: CPT | Performed by: FAMILY MEDICINE

## 2025-05-02 PROCEDURE — 1036F TOBACCO NON-USER: CPT | Performed by: FAMILY MEDICINE

## 2025-05-02 RX ORDER — INSULIN DEGLUDEC 100 U/ML
24 INJECTION, SOLUTION SUBCUTANEOUS EVERY MORNING
Qty: 9 ML | Refills: 1 | Status: SHIPPED | OUTPATIENT
Start: 2025-05-02

## 2025-05-02 ASSESSMENT — ENCOUNTER SYMPTOMS
NAUSEA: 0
CHOKING: 0
DIAPHORESIS: 0
DIZZINESS: 0
LIGHT-HEADEDNESS: 0
HEADACHES: 0
SHORTNESS OF BREATH: 0
WHEEZING: 0
VOMITING: 0
NECK STIFFNESS: 1
CHILLS: 0
FEVER: 0
CHEST TIGHTNESS: 0
APNEA: 0
COUGH: 0
PALPITATIONS: 0
UNEXPECTED WEIGHT CHANGE: 0

## 2025-05-02 ASSESSMENT — PATIENT HEALTH QUESTIONNAIRE - PHQ9
1. LITTLE INTEREST OR PLEASURE IN DOING THINGS: NOT AT ALL
SUM OF ALL RESPONSES TO PHQ9 QUESTIONS 1 AND 2: 0
2. FEELING DOWN, DEPRESSED OR HOPELESS: NOT AT ALL

## 2025-05-02 NOTE — PROGRESS NOTES
Subjective   Patient ID: Helga Sampson is a 50 y.o. female who presents for PAIN RADIATING FROM NECK INTO LEFT BREAST / BLOOD SUGAR UNSTABLE.  HPI Historian(s): Self    Generally feeling well.    Is checking glucose regularly. Reports low of , tends to be low overnight and mid morning.  Is getting significant lows. But nothing below 70, recently.  Is not having problems with medication.  Is seeing eye doctor at least annually.  Denies numbness, tingling, wound(s), dry skin, thick nails, and mycotic nails.  Is inspecting feet daily.     c/o left neck and shoulder pain radiating to the left chest. Random. Comes and goes. Does go away completely. 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 Not as bad on days when she is not lifting. Aleve is quite helpful.    Follow-up mammogram scheduled for later this month.        Review of Systems   Constitutional:  Negative for chills, diaphoresis, fever and unexpected weight change.   Eyes:  Negative for visual disturbance.   Respiratory:  Negative for apnea (no PND), cough, choking, chest tightness, shortness of breath and wheezing.    Cardiovascular:  Positive for chest pain. Negative for palpitations and leg swelling.   Gastrointestinal:  Negative for nausea and vomiting.   Musculoskeletal:  Positive for neck stiffness.   Neurological:  Negative for dizziness, syncope, light-headedness and headaches.   All other systems reviewed and are negative.    No LMP recorded.    Tobacco Use History[1]  Social History     Substance and Sexual Activity   Alcohol Use Never     Social History     Substance and Sexual Activity   Drug Use Never       Family History[2]    Patient Care Team:  Roderick Norman DO as PCP - General (Family Medicine)    RX Allergies[3]    Prior to Admission medications    Medication Sig Start Date End Date Taking? Authorizing Provider   alcohol swabs pads, medicated Check once daily, as instructed:  "fasting glucose at least once a week, and post-prandial 2-4 hours after a meal once daily other days. May dispense formulary equivalent. Dx: DM-2 (E11.9). 12/12/24   Roderick Norman DO   atorvastatin (Lipitor) 10 mg tablet Take 1 tablet (10 mg) by mouth once daily at bedtime. 3/14/25   Roderick Norman, DO   blood sugar diagnostic (Blood Glucose Test) strip Check once daily, as instructed: fasting glucose at least once a week, and post-prandial 2-4 hours after a meal once daily other days. May dispense formulary equivalent. Dx: DM-2 (E11.9). 12/12/24   Roderick Norman, DO   blood-glucose meter misc Check once daily, as instructed: fasting glucose at least once a week, and post-prandial 2-4 hours after a meal once daily other days. May dispense formulary equivalent. Dx: DM-2 (E11.9). 12/12/24   Roderick Norman, DO   blood-glucose sensor (FreeStyle Arely 2 Plus Sensor) device 1 each every 14 (fourteen) days. 3/14/25   Roderick Norman, DO   clobetasol (Temovate) 0.05 % ointment Apply topically 2 times a day as needed (rash, irritation). Use up to one week at a time 3/14/25   Roderick Norman, DO   flash glucose sensor kit (FreeStyle Arely 2 Sensor) kit USE AS DIRECTED 12/17/24   Roderick Norman DO   lancets (Lancets,Ultra Thin) misc Check once daily, as instructed: fasting glucose at least once a week, and post-prandial 2-4 hours after a meal once daily other days. May dispense formulary equivalent. Dx: DM-2 (E11.9). 12/12/24   Roderick Norman DO   nystatin (Mycostatin) 100,000 unit/gram powder APPLY  POWDER TOPICALLY THREE TIMES DAILY AS NEEDED FOR SKIN YEAST INFECTION 3/27/25   Roderick Norman, DO   pen needle, diabetic 32 gauge x 5/32\" needle 1 each once daily at bedtime. 12/11/24   Roderick Norman DO   traZODone (Desyrel) 50 mg tablet Take 1 tablet (50 mg) by mouth once daily at bedtime. TAKE 1/2 TO 1 (ONE-HALF TO ONE) TABLET BY MOUTH AS NEEDED AT BEDTIME FOR SLEEP 3/14/25   Roderick Norman, " "DO   Tresiba FlexTouch U-100 100 unit/mL (3 mL) pen INJECT 16 UNITS SUBCUTANEOUSLY AT BEDTIME OR  AS  DIRECTED.  MAX  30  UNITS  DAILY 4/4/25   Roderick Norman DO        Objective   /75   Pulse 74   Temp 37.1 °C (98.7 °F)   Ht 1.6 m (5' 3\")   Wt 106 kg (234 lb)   SpO2 98%   BMI 41.45 kg/m²     Lab Results   Component Value Date    HGBA1C 6.3 (H) 03/10/2025    HGBA1C 11.7 (H) 12/09/2024     Lab Results   Component Value Date    MICROALBCREA 30 - 300 (A) 12/12/2024      Lab Results   Component Value Date    LDLCALC 42 03/10/2025    LDLCALC 109 (H) 12/09/2024     Lab Results   Component Value Date    TRIG 68 03/10/2025    TRIG 200 (H) 12/09/2024      Lab Results   Component Value Date    CREATININE 0.63 03/10/2025    CREATININE 0.59 12/09/2024     Lab Results   Component Value Date    EGFR 107 03/10/2025    EGFR >90 12/09/2024          Physical Exam  Vitals and nursing note reviewed.   Constitutional:       General: She is not in acute distress.     Appearance: Normal appearance. She is not diaphoretic.      Comments: No assistive device presently being used.   HENT:      Head: Normocephalic and atraumatic.   Eyes:      General: No scleral icterus.     Extraocular Movements: Extraocular movements intact.      Conjunctiva/sclera: Conjunctivae normal.   Cardiovascular:      Rate and Rhythm: Normal rate and regular rhythm.      Heart sounds: Normal heart sounds.   Pulmonary:      Effort: Pulmonary effort is normal. No respiratory distress.      Breath sounds: Normal breath sounds. No wheezing, rhonchi or rales.   Musculoskeletal:         General: Tenderness (left costosternal junctions) present. No swelling or deformity. Normal range of motion.      Right lower leg: No edema.      Left lower leg: No edema.   Skin:     General: Skin is warm and dry.      Coloration: Skin is not jaundiced.   Neurological:      General: No focal deficit present.      Mental Status: She is alert and oriented to person, place, " and time. Mental status is at baseline.   Psychiatric:         Mood and Affect: Mood normal.         Behavior: Behavior normal.         Thought Content: Thought content normal.         Assessment & Plan  Type 2 diabetes mellitus with hyperglycemia, without long-term current use of insulin  Dramatically improved. Reduce Tresiba from 30 units to 24, and try dosing qAM instead of at bedtime.  Orders:    Tresiba FlexTouch U-100 100 unit/mL (3 mL) pen; Inject 24 Units under the skin once daily in the morning. Or as directed. Max 30 units daily.    Hypercholesterolemia  Well controlled.        Psychophysiological insomnia  Well controlled with Trazodone.       Chest pain, atypical  Costochondritis. Cold compresses, occasional NSAID. Check EKG, though unlikely cardiac.  Orders:    ECG 12 Lead                         [1]   Social History  Tobacco Use   Smoking Status Never   Smokeless Tobacco Never   [2]   Family History  Problem Relation Name Age of Onset    Breast cancer Paternal Grandmother     [3]   Allergies  Allergen Reactions    Shellfish Containing Products Unknown

## 2025-05-02 NOTE — ASSESSMENT & PLAN NOTE
Dramatically improved. Reduce Tresiba from 30 units to 24, and try dosing qAM instead of at bedtime.  Orders:    Tresiba FlexTouch U-100 100 unit/mL (3 mL) pen; Inject 24 Units under the skin once daily in the morning. Or as directed. Max 30 units daily.

## 2025-05-02 NOTE — PATIENT INSTRUCTIONS
"  Please return for a(n) diabetes and medication follow-up appointment and Wellness visit in July 2025, as scheduled, after tests to review results and options, earlier if any question or concern. Please schedule additional problem-focused appointment(s) to address additional problem(s). Simply mentioning or talking briefly about a problem or concern does not necessarily mean it is currently being addressed. Time constraints dictate that not every problem/concern can always be addressed.    Recommended vaccines:  Influenza, annual  Prevnar-20 \"pneumonia\" vaccine  Shingrix (shingles) vaccine series  TDaP (tetanus-diphtheria-pertussis) vaccine  Avoid taking Biotin (a vitamin, shows up particularly in hair/nail supplements) for a week prior to any blood tests, as it can interfere with certain results. Fasting for labs means 12 hours, nothing to eat or drink, except water and medications, unless directed otherwise.    For assistance with scheduling referrals or consultations, please call [phone]. For laboratory, radiology, and other tests, please call [phone] ([phone] for pediatrics). Please review prescription inserts and published information for possible adverse effects of all medications. Return after testing or consultation to review results and recommendations, if symptoms persist, change, worsen, or return, or if you have any question or concern. If you do not get results within 7-10 days, or you have any question or concern, please send a message, call the office ([phone]), or return to the office for a follow-up appointment. For non-emergencies, you may call the office. For emergencies, call 9-1-1 or go to the nearest Emergency Department. Please schedule additional appointment(s) to address concern(s) not addressed today. An annual Wellness visit is strongly recommended. A Wellness visit should be dedicated to addressing routine health maintenance matters (e.g., cancer screenings, " cardiovascular screening, etc.). Problem-focused visits, typically prompted by symptoms or specific concerns, are usually conducted separately, particularly if multiple or complex problems need to be addressed.    In general, results are not released or discussed over the telephone, but at an appointment or via  [employer]. Results of tests done through [facility] are released via  [employer] (see below).  [email or web address]   [employer] support line: [phone]

## 2025-05-29 ENCOUNTER — APPOINTMENT (OUTPATIENT)
Dept: GASTROENTEROLOGY | Facility: CLINIC | Age: 50
End: 2025-05-29
Payer: COMMERCIAL

## 2025-05-30 ENCOUNTER — HOSPITAL ENCOUNTER (OUTPATIENT)
Dept: RADIOLOGY | Facility: HOSPITAL | Age: 50
Discharge: HOME | End: 2025-05-30
Payer: COMMERCIAL

## 2025-05-30 VITALS — WEIGHT: 244 LBS | HEIGHT: 63 IN | BODY MASS INDEX: 43.23 KG/M2

## 2025-05-30 DIAGNOSIS — R92.8 ABNORMAL MAMMOGRAM OF BOTH BREASTS: ICD-10-CM

## 2025-05-30 PROCEDURE — 76642 ULTRASOUND BREAST LIMITED: CPT

## 2025-05-30 PROCEDURE — 77066 DX MAMMO INCL CAD BI: CPT

## 2025-05-30 PROCEDURE — 76983 USE EA ADDL TARGET LESION: CPT

## 2025-06-02 ENCOUNTER — APPOINTMENT (OUTPATIENT)
Dept: PRIMARY CARE | Facility: CLINIC | Age: 50
End: 2025-06-02
Payer: COMMERCIAL

## 2025-06-16 DIAGNOSIS — E11.65 TYPE 2 DIABETES MELLITUS WITH HYPERGLYCEMIA, WITHOUT LONG-TERM CURRENT USE OF INSULIN: Primary | ICD-10-CM

## 2025-06-17 DIAGNOSIS — E11.65 TYPE 2 DIABETES MELLITUS WITH HYPERGLYCEMIA, WITHOUT LONG-TERM CURRENT USE OF INSULIN: ICD-10-CM

## 2025-06-17 RX ORDER — BLOOD-GLUCOSE SENSOR
1 EACH MISCELLANEOUS
Qty: 6 EACH | Refills: 3 | Status: SHIPPED | OUTPATIENT
Start: 2025-06-17

## 2025-06-19 RX ORDER — BLOOD-GLUCOSE SENSOR
1 EACH MISCELLANEOUS
Qty: 6 EACH | Refills: 3 | Status: SHIPPED | OUTPATIENT
Start: 2025-06-19

## 2025-06-20 ENCOUNTER — TELEPHONE (OUTPATIENT)
Dept: PRIMARY CARE | Facility: CLINIC | Age: 50
End: 2025-06-20
Payer: COMMERCIAL

## 2025-06-20 DIAGNOSIS — R92.8 ABNORMAL MAMMOGRAM OF BOTH BREASTS: Primary | ICD-10-CM

## 2025-06-20 NOTE — TELEPHONE ENCOUNTER
Pt states she was given a Medro dose pac and is asking if she should take the medication because it will make her blood sugar rise. Pt would like to know if she is to take the dose pac how should she take her other medications. [phone] (pt states she is a diabetic and would like to know ASAP)

## 2025-06-20 NOTE — TELEPHONE ENCOUNTER
Left a message on vm that pt should take the prednisone as prescribed if sugars go above 200 be seen here, if over 400 ER for Evaluation

## 2025-06-21 ENCOUNTER — TELEPHONE (OUTPATIENT)
Dept: PRIMARY CARE | Facility: CLINIC | Age: 50
End: 2025-06-21
Payer: COMMERCIAL

## 2025-06-21 NOTE — TELEPHONE ENCOUNTER
Result Communication    Resulted Orders   BI mammo bilateral diagnostic tomosynthesis    Narrative    Interpreted By:  Tamara Duff,   STUDY:  BI MAMMO BILATERAL DIAGNOSTIC TOMOSYNTHESIS; BI US BREAST LIMITED  BILATERAL;  5/30/2025 10:12 am; 5/30/2025 10:19 am      ACCESSION NUMBER(S):  [id number]; [id number]      ORDERING CLINICIAN:  YING [place]STON      INDICATION:      ,R92.8 Other abnormal and inconclusive findings on diagnostic imaging  of breast      COMPARISON:  10/15/2024      FINDINGS:  MAMMOGRAPHY: 2D and tomosynthesis images were reviewed at 1 mm slice  thickness. Study was reviewed with the aid of CAD      Density:  The breasts are extremely dense, which lowers the  sensitivity of mammography.      Multiple low-density masses some of which with peripheral  calcifications stable since previous exam are seen bilaterally. No  additional suspicious masses or calcifications are identified.      ULTRASOUND: Targeted ultrasound was performed of the bilateral  breasts.      Right breast:  At 9 o'clock position, 10 cm from the nipple there is stable  homogeneous hypoechoic mass with irregular borders and increased  through transmission measuring up to 11 x 8 x 9 mm in size.  Additional similar mass with smaller borders at 10 o'clock position,  9 cm from the nipple. This measures approximately 17 x 12 x 19 mm in  size. The mass demonstrates partial increased through transmission.  There is a small similar mass at 10 o'clock position, 10 cm from the  nipple measuring 3 x 3 x 4 mm in size. These masses are suggestive of  benign process such as fibroadenomas. No additional cystic or solid  lesions.      Left breast:  Targeted ultrasound of the left breast at 1 o'clock position, 10 cm  from the nipple demonstrates a homogeneous hypoechoic mass with sharp  borders and posterior shadowing similar to previous exam measuring 15  x 12 x 14 mm in size. This is grossly unchanged since prior. Similar  mass with irregular  borders at 2 o'clock position, 10 cm from the  nipple. This measures 6 x 5 x 7 mm in size. Both masses are soft on  elastography. No additional suspicious masses are seen.. No  significant interval change since prior.        Impression    Stable probably benign masses bilaterally. As a precaution, 12 month  follow-up mammogram and ultrasound recommended to document continued  stability.      BI-RADS CATEGORY:  BI-RADS Category:  3 Probably Benign.  Recommendation:  12 month follow-up  Recommended Date:  12 months  Laterality:  Bilateral.      For any future breast imaging appointments, please call [phone]  [phone]).          MACRO:  None      Signed by: Tamara Duff 5/30/2025 3:28 PM  Dictation workstation:   [id number]       11:51 AM      Results were successfully communicated with the patient and they acknowledged their understanding. Pt viewed results on my chart

## 2025-06-21 NOTE — TELEPHONE ENCOUNTER
"----- Message from Roderick Norman sent at 6/20/2025  5:41 PM EDT -----  Please make sure patient is aware of the comments or MyChart message.      Mammogram is reported as BI-RADS category 3, \"probably benign,\" but requires additional follow-up. Recommend follow-up imaging in 12 months, as ordered, with a follow-up appointment a couple days   after, to review results. Return earlier, if any question or concern.  ----- Message -----  From: Fan, Radiology Results In  Sent: 5/30/2025   3:29 PM EDT  To: Roderick Norman, DO    "

## 2025-06-27 ENCOUNTER — TELEPHONE (OUTPATIENT)
Dept: PRIMARY CARE | Facility: CLINIC | Age: 50
End: 2025-06-27
Payer: COMMERCIAL

## 2025-07-08 ENCOUNTER — APPOINTMENT (OUTPATIENT)
Dept: PRIMARY CARE | Facility: CLINIC | Age: 50
End: 2025-07-08
Payer: COMMERCIAL

## 2025-07-08 VITALS
SYSTOLIC BLOOD PRESSURE: 114 MMHG | BODY MASS INDEX: 42.34 KG/M2 | OXYGEN SATURATION: 99 % | HEART RATE: 73 BPM | WEIGHT: 239 LBS | DIASTOLIC BLOOD PRESSURE: 77 MMHG

## 2025-07-08 DIAGNOSIS — K59.04 CHRONIC IDIOPATHIC CONSTIPATION: ICD-10-CM

## 2025-07-08 DIAGNOSIS — B37.2 CANDIDIASIS, INTERTRIGINOUS: ICD-10-CM

## 2025-07-08 DIAGNOSIS — Z79.4 TYPE 2 DIABETES MELLITUS WITH DIABETIC MICROALBUMINURIA, WITH LONG-TERM CURRENT USE OF INSULIN (MULTI): Primary | ICD-10-CM

## 2025-07-08 DIAGNOSIS — R80.9 TYPE 2 DIABETES MELLITUS WITH DIABETIC MICROALBUMINURIA, WITH LONG-TERM CURRENT USE OF INSULIN (MULTI): Primary | ICD-10-CM

## 2025-07-08 DIAGNOSIS — F51.04 PSYCHOPHYSIOLOGICAL INSOMNIA: ICD-10-CM

## 2025-07-08 DIAGNOSIS — L25.3 CONTACT DERMATITIS DUE TO CHEMICALS: ICD-10-CM

## 2025-07-08 DIAGNOSIS — J30.2 SEASONAL ALLERGIC RHINITIS, UNSPECIFIED TRIGGER: ICD-10-CM

## 2025-07-08 DIAGNOSIS — H60.501 ACUTE OTITIS EXTERNA OF RIGHT EAR, UNSPECIFIED TYPE: ICD-10-CM

## 2025-07-08 DIAGNOSIS — E11.29 TYPE 2 DIABETES MELLITUS WITH DIABETIC MICROALBUMINURIA, WITH LONG-TERM CURRENT USE OF INSULIN (MULTI): Primary | ICD-10-CM

## 2025-07-08 DIAGNOSIS — E78.00 HYPERCHOLESTEROLEMIA: ICD-10-CM

## 2025-07-08 LAB — POC HEMOGLOBIN A1C: 5.6 % (ref 4.2–6.5)

## 2025-07-08 PROCEDURE — 99214 OFFICE O/P EST MOD 30 MIN: CPT | Performed by: FAMILY MEDICINE

## 2025-07-08 PROCEDURE — 3078F DIAST BP <80 MM HG: CPT | Performed by: FAMILY MEDICINE

## 2025-07-08 PROCEDURE — 1036F TOBACCO NON-USER: CPT | Performed by: FAMILY MEDICINE

## 2025-07-08 PROCEDURE — 83036 HEMOGLOBIN GLYCOSYLATED A1C: CPT | Performed by: FAMILY MEDICINE

## 2025-07-08 PROCEDURE — 3074F SYST BP LT 130 MM HG: CPT | Performed by: FAMILY MEDICINE

## 2025-07-08 PROCEDURE — 3044F HG A1C LEVEL LT 7.0%: CPT | Performed by: FAMILY MEDICINE

## 2025-07-08 RX ORDER — TRAZODONE HYDROCHLORIDE 50 MG/1
50 TABLET ORAL NIGHTLY
Qty: 100 TABLET | Refills: 3 | Status: SHIPPED | OUTPATIENT
Start: 2025-07-08

## 2025-07-08 RX ORDER — PSYLLIUM HUSK 0.4 G
1 CAPSULE ORAL
Start: 2025-07-08

## 2025-07-08 RX ORDER — CLOBETASOL PROPIONATE 0.5 MG/G
OINTMENT TOPICAL 2 TIMES DAILY PRN
Qty: 60 G | Refills: 1 | Status: SHIPPED | OUTPATIENT
Start: 2025-07-08

## 2025-07-08 RX ORDER — ATORVASTATIN CALCIUM 10 MG/1
10 TABLET, FILM COATED ORAL NIGHTLY
Qty: 100 TABLET | Refills: 3 | Status: SHIPPED | OUTPATIENT
Start: 2025-07-08

## 2025-07-08 RX ORDER — NEOMYCIN SULFATE, POLYMYXIN B SULFATE AND HYDROCORTISONE 10; 3.5; 1 MG/ML; MG/ML; [USP'U]/ML
3 SUSPENSION/ DROPS AURICULAR (OTIC) 4 TIMES DAILY
Qty: 10 ML | Refills: 0 | Status: SHIPPED | OUTPATIENT
Start: 2025-07-08 | End: 2025-07-18

## 2025-07-08 RX ORDER — AZELASTINE HCL 205.5 UG/1
205.5 SPRAY NASAL 2 TIMES DAILY
Start: 2025-07-08

## 2025-07-08 RX ORDER — NYSTATIN 100000 [USP'U]/G
1 POWDER TOPICAL 3 TIMES DAILY
Qty: 60 G | Refills: 2 | Status: SHIPPED | OUTPATIENT
Start: 2025-07-08 | End: 2025-07-22

## 2025-07-08 RX ORDER — FLUTICASONE FUROATE 27.5 UG/1
2 SPRAY, METERED NASAL
Start: 2025-07-08

## 2025-07-08 NOTE — ASSESSMENT & PLAN NOTE
Orders:    traZODone (Desyrel) 50 mg tablet; Take 1 tablet (50 mg) by mouth once daily at bedtime. TAKE 1/2 TO 1 (ONE-HALF TO ONE) TABLET BY MOUTH AS NEEDED AT BEDTIME FOR SLEEP

## 2025-07-08 NOTE — PROGRESS NOTES
Subjective   Patient ID: Helga Sampson is a 50 y.o. female who presents for Follow-up (Pt presents for 6 month check up, no concerns, refills needed.).  HPI Historian(s): Self    Decreased Tresiba to 20 units on 7-4-2025 due to CGM low alarms.    Is checking glucose regularly. CGM. Averaging about 100. Low of about 60. But since decreasing Tresiba to 20 units, no more low alarms.   Is getting significant lows. Has since decreased Tresiba.  Is not having problems with medication.  Is seeing eye doctor at least annually.  Denies numbness, tingling, wound(s), dry skin, thick nails, and mycotic nails.  Is inspecting feet daily.         Review of Systems   All other systems reviewed and are negative.    Patient's last menstrual period was 06/25/2025.    Tobacco Use History[1]  Social History     Substance and Sexual Activity   Alcohol Use Never     Social History     Substance and Sexual Activity   Drug Use Never       Family History[2]    Patient Care Team:  Roderick Norman DO as PCP - General (Family Medicine)    RX Allergies[3]    Prior to Admission medications    Medication Sig Start Date End Date Taking? Authorizing Provider   alcohol swabs pads, medicated Check once daily, as instructed: fasting glucose at least once a week, and post-prandial 2-4 hours after a meal once daily other days. May dispense formulary equivalent. Dx: DM-2 (E11.9). 12/12/24  Yes Roderick Norman DO   atorvastatin (Lipitor) 10 mg tablet Take 1 tablet (10 mg) by mouth once daily at bedtime. 3/14/25  Yes Roderick Norman DO   blood sugar diagnostic (Blood Glucose Test) strip Check once daily, as instructed: fasting glucose at least once a week, and post-prandial 2-4 hours after a meal once daily other days. May dispense formulary equivalent. Dx: DM-2 (E11.9). 12/12/24  Yes Roderick Norman DO   blood-glucose meter misc Check once daily, as instructed: fasting glucose at least once a week, and post-prandial 2-4 hours after a meal once  "daily other days. May dispense formulary equivalent. Dx: DM-2 (E11.9). 12/12/24  Yes Roderick Norman, DO   blood-glucose sensor (FreeStyle Arely 3 Plus Sensor) device Place 1 each on the skin every 14 (fourteen) days. Up to 15 days. 6/17/25  Yes Roderick Norman, DO   blood-glucose sensor (FreeStyle Arely 3 Plus Sensor) device Place 1 each on the skin every 14 (fourteen) days. Up to 15 days. 6/19/25  Yes Roderick Norman, DO   clobetasol (Temovate) 0.05 % ointment Apply topically 2 times a day as needed (rash, irritation). Use up to one week at a time 3/14/25  Yes Roderick Norman, DO   lancets (Lancets,Ultra Thin) misc Check once daily, as instructed: fasting glucose at least once a week, and post-prandial 2-4 hours after a meal once daily other days. May dispense formulary equivalent. Dx: DM-2 (E11.9). 12/12/24  Yes Roderick Norman DO   pen needle, diabetic 32 gauge x 5/32\" needle 1 each once daily at bedtime. 12/11/24  Yes Roderick Norman, DO   traZODone (Desyrel) 50 mg tablet Take 1 tablet (50 mg) by mouth once daily at bedtime. TAKE 1/2 TO 1 (ONE-HALF TO ONE) TABLET BY MOUTH AS NEEDED AT BEDTIME FOR SLEEP 3/14/25  Yes Roderick Norman DO   Tresiba FlexTouch U-100 100 unit/mL (3 mL) pen Inject 24 Units under the skin once daily in the morning. Or as directed. Max 30 units daily. 5/2/25  Yes Roderick Norman, DO   blood-glucose sensor (FreeStyle Arely 2 Plus Sensor) device 1 each every 14 (fourteen) days.  Patient not taking: Reported on 7/8/2025 3/14/25   Roderick Norman DO   flash glucose sensor kit (FreeStyle Arely 2 Sensor) kit USE AS DIRECTED  Patient not taking: Reported on 7/8/2025 12/17/24   Roderick Norman DO        Objective     Vitals:    07/08/25 1449   BP: 114/77   Pulse: 73   SpO2: 99%   Weight: 108 kg (239 lb)        Lab Results   Component Value Date    HGBA1C 5.6 07/08/2025    HGBA1C 6.3 (H) 03/10/2025    HGBA1C 11.7 (H) 12/09/2024     Lab Results   Component Value Date    " MICROALBCREA 30 - 300 (A) 12/12/2024      Lab Results   Component Value Date    LDLCALC 42 03/10/2025    LDLCALC 109 (H) 12/09/2024     Lab Results   Component Value Date    TRIG 68 03/10/2025    TRIG 200 (H) 12/09/2024      Lab Results   Component Value Date    CREATININE 0.63 03/10/2025    CREATININE 0.59 12/09/2024     Lab Results   Component Value Date    EGFR 107 03/10/2025    EGFR >90 12/09/2024          Physical Exam  Vitals and nursing note reviewed.   Constitutional:       General: She is not in acute distress.     Appearance: Normal appearance.      Comments: No assistive device presently being used.   HENT:      Head: Normocephalic and atraumatic.   Eyes:      General: No scleral icterus.     Extraocular Movements: Extraocular movements intact.      Conjunctiva/sclera: Conjunctivae normal.   Pulmonary:      Effort: Pulmonary effort is normal. No respiratory distress.   Skin:     General: Skin is warm and dry.      Coloration: Skin is not jaundiced.   Neurological:      Mental Status: She is alert and oriented to person, place, and time. Mental status is at baseline.   Psychiatric:         Behavior: Behavior normal.         Assessment & Plan  Type 2 diabetes mellitus with diabetic microalbuminuria, with long-term current use of insulin (Multi)  Wants to continue Tresiba. Decrease as necessary to maintain glucose > 80. May consider switching to oral or once weekly injectable medication.   Orders:    POCT glycosylated hemoglobin (Hb A1C) manually resulted    Albumin-Creatinine Ratio, Urine Random    Hypercholesterolemia  Well controlled.   Orders:    atorvastatin (Lipitor) 10 mg tablet; Take 1 tablet (10 mg) by mouth once daily at bedtime.    Contact dermatitis due to chemicals  From CGM stickers. Responds well to Clobetasol.  Orders:    clobetasol (Temovate) 0.05 % ointment; Apply topically 2 times a day as needed (rash, irritation). Use up to one week at a time    Psychophysiological insomnia    Orders:     traZODone (Desyrel) 50 mg tablet; Take 1 tablet (50 mg) by mouth once daily at bedtime. TAKE 1/2 TO 1 (ONE-HALF TO ONE) TABLET BY MOUTH AS NEEDED AT BEDTIME FOR SLEEP    Candidiasis, intertriginous    Orders:    nystatin (Mycostatin) 100,000 unit/gram powder; Apply 1 Application topically 3 times a day for 14 days. As needed.    Acute otitis externa of right ear, unspecified type    Orders:    neomycin-polymyxin-HC (Cortisporin) 3.5-10,000-1 mg/mL-unit/mL-% otic suspension; Administer 3 drops into the right ear 4 times a day for 10 days.    Seasonal allergic rhinitis, unspecified trigger    Orders:    fluticasone (Flonase Sensimist) 27.5 mcg/actuation nasal spray; Administer 2 sprays into each nostril once daily.    azelastine (Astepro Allergy) 205.5 mcg (0.15 %) spray,non-aerosol; Administer 205.5 mcg into affected nostril(s) 2 times a day.    Chronic idiopathic constipation    Orders:    psyllium (MetamuciL) 0.4 gram capsule; Take 1 capsule by mouth 3 times a day before meals.                        [1]   Social History  Tobacco Use   Smoking Status Never   Smokeless Tobacco Never   [2]   Family History  Problem Relation Name Age of Onset    Breast cancer Paternal Grandmother     [3]   Allergies  Allergen Reactions    Shellfish Containing Products Unknown

## 2025-07-08 NOTE — PATIENT INSTRUCTIONS
"  Please return for a(n) diabetes and medication follow-up appointment and Wellness visit in 3 months, after tests to review results and options, earlier if any question or concern. Please schedule additional problem-focused appointment(s) to address additional problem(s). Simply mentioning or talking briefly about a problem or concern does not necessarily mean it is currently being addressed. Time constraints dictate that not every problem/concern can always be addressed.    Recommended vaccines:  Influenza, annual  Prevnar-20 \"pneumonia\" vaccine  Shingrix (shingles) vaccine series  TDaP (tetanus-diphtheria-pertussis) vaccine  Avoid taking Biotin (a vitamin, shows up particularly in hair/nail supplements) for a week prior to any blood tests, as it can interfere with certain results. Fasting for labs means 12 hours, nothing to eat or drink, except water and medications, unless directed otherwise.    For assistance with scheduling referrals or consultations, please call [phone]. For laboratory, radiology, and other tests, please call [phone] ([phone] for pediatrics). Please review prescription inserts and published information for possible adverse effects of all medications. Return after testing or consultation to review results and recommendations, if symptoms persist, change, worsen, or return, or if you have any question or concern. If you do not get results within 7-10 days, or you have any question or concern, please send a message, call the office ([phone]), or return to the office for a follow-up appointment. For non-emergencies, you may call the office. For emergencies, call 9-1-1 or go to the nearest Emergency Department. Please schedule additional appointment(s) to address concern(s) not addressed today. An annual Wellness visit is strongly recommended. A Wellness visit should be dedicated to addressing routine health maintenance matters (e.g., cancer screenings, cardiovascular " screening, etc.). Problem-focused visits, typically prompted by symptoms or specific concerns, are usually conducted separately, particularly if multiple or complex problems need to be addressed.    In general, results are not released or discussed over the telephone, but at an appointment or via  [employer]. Results of tests done through [facility] are released via  [employer] (see below).  [email or web address]   [employer] support line: [phone]

## 2025-07-08 NOTE — ASSESSMENT & PLAN NOTE
Well controlled.   Orders:    atorvastatin (Lipitor) 10 mg tablet; Take 1 tablet (10 mg) by mouth once daily at bedtime.

## 2025-07-08 NOTE — ASSESSMENT & PLAN NOTE
Orders:    nystatin (Mycostatin) 100,000 unit/gram powder; Apply 1 Application topically 3 times a day for 14 days. As needed.

## 2025-07-08 NOTE — ASSESSMENT & PLAN NOTE
Wants to continue Tresiba. Decrease as necessary to maintain glucose > 80. May consider switching to oral or once weekly injectable medication.   Orders:    POCT glycosylated hemoglobin (Hb A1C) manually resulted    Albumin-Creatinine Ratio, Urine Random

## 2025-07-09 LAB
ALBUMIN/CREAT UR: NORMAL MG/G CREAT
CREAT UR-MCNC: 44 MG/DL (ref 20–275)
MICROALBUMIN UR-MCNC: <0.2 MG/DL

## 2025-07-18 DIAGNOSIS — E11.65 TYPE 2 DIABETES MELLITUS WITH HYPERGLYCEMIA, WITHOUT LONG-TERM CURRENT USE OF INSULIN: ICD-10-CM

## 2025-07-22 RX ORDER — INSULIN DEGLUDEC 100 U/ML
INJECTION, SOLUTION SUBCUTANEOUS
Qty: 9 ML | Refills: 0 | Status: SHIPPED | OUTPATIENT
Start: 2025-07-22

## 2025-08-05 DIAGNOSIS — E11.65 TYPE 2 DIABETES MELLITUS WITH HYPERGLYCEMIA, WITHOUT LONG-TERM CURRENT USE OF INSULIN: ICD-10-CM

## 2025-08-07 RX ORDER — INSULIN DEGLUDEC 100 U/ML
INJECTION, SOLUTION SUBCUTANEOUS
Qty: 9 ML | Refills: 3 | Status: SHIPPED | OUTPATIENT
Start: 2025-08-07

## 2025-08-12 DIAGNOSIS — E11.65 TYPE 2 DIABETES MELLITUS WITH HYPERGLYCEMIA, WITHOUT LONG-TERM CURRENT USE OF INSULIN: ICD-10-CM

## 2025-08-14 RX ORDER — PEN NEEDLE, DIABETIC 30 GX3/16"
NEEDLE, DISPOSABLE MISCELLANEOUS
Qty: 100 EACH | Refills: 3 | Status: SHIPPED | OUTPATIENT
Start: 2025-08-14

## 2025-08-30 DIAGNOSIS — L25.3 CONTACT DERMATITIS DUE TO CHEMICALS: ICD-10-CM

## 2025-09-03 RX ORDER — CLOBETASOL PROPIONATE 0.5 MG/G
OINTMENT TOPICAL
Qty: 60 G | Refills: 0 | Status: SHIPPED | OUTPATIENT
Start: 2025-09-03

## 2025-09-16 ENCOUNTER — APPOINTMENT (OUTPATIENT)
Dept: OBSTETRICS AND GYNECOLOGY | Facility: CLINIC | Age: 50
End: 2025-09-16
Payer: COMMERCIAL

## 2025-10-24 ENCOUNTER — APPOINTMENT (OUTPATIENT)
Dept: PRIMARY CARE | Facility: CLINIC | Age: 50
End: 2025-10-24
Payer: COMMERCIAL